# Patient Record
Sex: FEMALE | Race: WHITE | NOT HISPANIC OR LATINO | Employment: PART TIME | URBAN - METROPOLITAN AREA
[De-identification: names, ages, dates, MRNs, and addresses within clinical notes are randomized per-mention and may not be internally consistent; named-entity substitution may affect disease eponyms.]

---

## 2017-02-17 ENCOUNTER — HOSPITAL ENCOUNTER (OUTPATIENT)
Dept: OTHER | Facility: HOSPITAL | Age: 57
Discharge: HOME | End: 2017-02-17
Attending: OBSTETRICS & GYNECOLOGY | Admitting: OBSTETRICS & GYNECOLOGY

## 2017-02-17 LAB
BASOPHILS # BLD AUTO: 0.1 10*3/UL (ref 0–0.2)
BASOPHILS NFR BLD AUTO: 1 % (ref 0–2)
DIFFERENTIAL METHOD BLD: (no result)
EOSINOPHIL # BLD AUTO: 0.2 10*3/UL (ref 0–0.3)
EOSINOPHIL # BLD AUTO: 2 % (ref 0–3)
ERYTHROCYTE [DISTWIDTH] IN BLOOD BY AUTOMATED COUNT: 13.4 % (ref 11.5–14.5)
HCT VFR BLD AUTO: 38.3 % (ref 35–49)
HGB BLD-MCNC: 12.9 G/DL (ref 12–15)
LYMPHOCYTES # BLD AUTO: 1.9 10*3/UL (ref 0.8–4.8)
LYMPHOCYTES NFR BLD AUTO: 28 % (ref 18–42)
MCH RBC QN AUTO: 31.2 PG (ref 26–32)
MCHC RBC AUTO-ENTMCNC: 33.8 G/DL (ref 32–36)
MCV RBC AUTO: 92.2 FL (ref 80–94)
MONOCYTES # BLD AUTO: 0.4 10*3/UL (ref 0.1–1.3)
MONOCYTES NFR BLD AUTO: 7 % (ref 2–11)
NEUTROPHILS # BLD AUTO: 4.1 10*3/UL (ref 2.3–8.6)
NEUTROPHILS NFR BLD AUTO: 62 % (ref 50–75)
NRBC BLD AUTO-RTO: 0 /100{WBCS}
NRBC/RBC NFR BLD MANUAL: 0 10*3/UL
PLATELET # BLD AUTO: 208 10*3/UL (ref 150–450)
PMV BLD AUTO: 9.3 FL (ref 7.4–10.4)
RBC # BLD AUTO: 4.15 10*6/UL (ref 4–5.4)
WBC # BLD AUTO: 6.7 10*3/UL (ref 4.5–11.5)

## 2017-09-11 ENCOUNTER — APPOINTMENT (OUTPATIENT)
Dept: WOMENS IMAGING | Facility: HOSPITAL | Age: 57
End: 2017-09-11

## 2017-09-11 PROCEDURE — 77063 BREAST TOMOSYNTHESIS BI: CPT | Performed by: RADIOLOGY

## 2017-09-11 PROCEDURE — 77067 SCR MAMMO BI INCL CAD: CPT | Performed by: RADIOLOGY

## 2018-10-05 ENCOUNTER — APPOINTMENT (OUTPATIENT)
Dept: WOMENS IMAGING | Facility: HOSPITAL | Age: 58
End: 2018-10-05

## 2018-10-05 PROCEDURE — 77063 BREAST TOMOSYNTHESIS BI: CPT | Performed by: RADIOLOGY

## 2018-10-05 PROCEDURE — 77067 SCR MAMMO BI INCL CAD: CPT | Performed by: RADIOLOGY

## 2019-08-22 ENCOUNTER — OFFICE VISIT (OUTPATIENT)
Dept: FAMILY MEDICINE CLINIC | Facility: CLINIC | Age: 59
End: 2019-08-22

## 2019-08-22 VITALS
DIASTOLIC BLOOD PRESSURE: 80 MMHG | BODY MASS INDEX: 23.92 KG/M2 | HEART RATE: 57 BPM | OXYGEN SATURATION: 100 % | WEIGHT: 135 LBS | SYSTOLIC BLOOD PRESSURE: 113 MMHG | HEIGHT: 63 IN

## 2019-08-22 DIAGNOSIS — G89.29 CHRONIC LEFT-SIDED LOW BACK PAIN WITH LEFT-SIDED SCIATICA: Primary | ICD-10-CM

## 2019-08-22 DIAGNOSIS — M54.42 CHRONIC LEFT-SIDED LOW BACK PAIN WITH LEFT-SIDED SCIATICA: Primary | ICD-10-CM

## 2019-08-22 PROCEDURE — 99213 OFFICE O/P EST LOW 20 MIN: CPT | Performed by: NURSE PRACTITIONER

## 2019-08-22 RX ORDER — BACLOFEN 10 MG/1
10 TABLET ORAL 3 TIMES DAILY
Qty: 30 TABLET | Refills: 0 | Status: SHIPPED | OUTPATIENT
Start: 2019-08-22 | End: 2019-12-04

## 2019-08-22 NOTE — PROGRESS NOTES
Subjective   Natividad Messer is a 59 y.o. female.     Patient presents with worsening low back pain. She reports ongoing issues for the past 3 years. She has MRI completed 3 years ago.       Back Pain   This is a chronic problem. The current episode started more than 1 year ago. The problem occurs daily. The problem has been gradually worsening since onset. The pain is present in the lumbar spine. The quality of the pain is described as shooting and stabbing. The pain radiates to the left knee. The symptoms are aggravated by standing. Pertinent negatives include no abdominal pain, bladder incontinence, bowel incontinence, chest pain, dysuria, fever, headaches, leg pain, numbness, paresis, paresthesias, pelvic pain, perianal numbness, tingling, weakness or weight loss.        The following portions of the patient's history were reviewed and updated as appropriate: allergies, current medications, past family history, past medical history, past social history, past surgical history and problem list.    Review of Systems   Constitutional: Negative for fever and unexpected weight loss.   Cardiovascular: Negative for chest pain.   Gastrointestinal: Negative for abdominal pain and bowel incontinence.   Genitourinary: Negative for dysuria, pelvic pain and urinary incontinence.   Musculoskeletal: Positive for back pain.   Neurological: Negative for tingling, weakness, numbness and paresthesias.       Objective   Physical Exam   Constitutional: She is oriented to person, place, and time. She appears well-developed and well-nourished. No distress.   Musculoskeletal: Normal range of motion. She exhibits no edema.   Neurological: She is alert and oriented to person, place, and time. She displays normal reflexes. No sensory deficit. She exhibits normal muscle tone. Coordination normal.   Skin: She is not diaphoretic.         Assessment/Plan   Natividad was seen today for establish care and back pain.    Diagnoses and all orders for  this visit:    Chronic left-sided low back pain with left-sided sciatica  -     Ambulatory Referral to Neurosurgery  -     MRI Lumbar Spine Without Contrast; Future  -     baclofen (LIORESAL) 10 MG tablet; Take 1 tablet by mouth 3 (Three) Times a Day.  - Patient taking OTC NSAIDs and doing PT exercises.  Send for repeat MRI and ortho spine referral.

## 2019-10-18 ENCOUNTER — APPOINTMENT (OUTPATIENT)
Dept: WOMENS IMAGING | Facility: HOSPITAL | Age: 59
End: 2019-10-18

## 2019-10-18 PROCEDURE — 77067 SCR MAMMO BI INCL CAD: CPT | Performed by: RADIOLOGY

## 2019-10-18 PROCEDURE — 77063 BREAST TOMOSYNTHESIS BI: CPT | Performed by: RADIOLOGY

## 2019-12-02 ENCOUNTER — TELEPHONE (OUTPATIENT)
Dept: NEUROSURGERY | Facility: CLINIC | Age: 59
End: 2019-12-02

## 2019-12-02 NOTE — TELEPHONE ENCOUNTER
Called patient- she was never sent NP packet- I mailed her one and told her to arrive 30 min with it completed/cc

## 2019-12-04 ENCOUNTER — OFFICE VISIT (OUTPATIENT)
Dept: FAMILY MEDICINE CLINIC | Facility: CLINIC | Age: 59
End: 2019-12-04

## 2019-12-04 VITALS
OXYGEN SATURATION: 100 % | HEART RATE: 58 BPM | HEIGHT: 63 IN | TEMPERATURE: 97.7 F | WEIGHT: 133 LBS | BODY MASS INDEX: 23.57 KG/M2 | SYSTOLIC BLOOD PRESSURE: 169 MMHG | DIASTOLIC BLOOD PRESSURE: 79 MMHG

## 2019-12-04 DIAGNOSIS — K64.4 EXTERNAL HEMORRHOIDS: Primary | ICD-10-CM

## 2019-12-04 DIAGNOSIS — R03.0 ELEVATED BP WITHOUT DIAGNOSIS OF HYPERTENSION: ICD-10-CM

## 2019-12-04 DIAGNOSIS — M51.16 HERNIATION OF LUMBAR INTERVERTEBRAL DISC WITH RADICULOPATHY: ICD-10-CM

## 2019-12-04 PROBLEM — H81.10 BENIGN PAROXYSMAL POSITIONAL VERTIGO: Status: ACTIVE | Noted: 2017-11-30

## 2019-12-04 PROCEDURE — 99213 OFFICE O/P EST LOW 20 MIN: CPT | Performed by: FAMILY MEDICINE

## 2019-12-04 NOTE — PATIENT INSTRUCTIONS
Hemorrhoids  Hemorrhoids are swollen veins in and around the rectum or anus. There are two types of hemorrhoids:  · Internal hemorrhoids. These occur in the veins that are just inside the rectum. They may poke through to the outside and become irritated and painful.  · External hemorrhoids. These occur in the veins that are outside the anus and can be felt as a painful swelling or hard lump near the anus.  Most hemorrhoids do not cause serious problems, and they can be managed with home treatments such as diet and lifestyle changes. If home treatments do not help the symptoms, procedures can be done to shrink or remove the hemorrhoids.  What are the causes?  This condition is caused by increased pressure in the anal area. This pressure may result from various things, including:  · Constipation.  · Straining to have a bowel movement.  · Diarrhea.  · Pregnancy.  · Obesity.  · Sitting for long periods of time.  · Heavy lifting or other activity that causes you to strain.  · Anal sex.  · Riding a bike for a long period of time.  What are the signs or symptoms?  Symptoms of this condition include:  · Pain.  · Anal itching or irritation.  · Rectal bleeding.  · Leakage of stool (feces).  · Anal swelling.  · One or more lumps around the anus.  How is this diagnosed?  This condition can often be diagnosed through a visual exam. Other exams or tests may also be done, such as:  · An exam that involves feeling the rectal area with a gloved hand (digital rectal exam).  · An exam of the anal canal that is done using a small tube (anoscope).  · A blood test, if you have lost a significant amount of blood.  · A test to look inside the colon using a flexible tube with a camera on the end (sigmoidoscopy or colonoscopy).  How is this treated?  This condition can usually be treated at home. However, various procedures may be done if dietary changes, lifestyle changes, and other home treatments do not help your symptoms. These  procedures can help make the hemorrhoids smaller or remove them completely. Some of these procedures involve surgery, and others do not. Common procedures include:  · Rubber band ligation. Rubber bands are placed at the base of the hemorrhoids to cut off their blood supply.  · Sclerotherapy. Medicine is injected into the hemorrhoids to shrink them.  · Infrared coagulation. A type of light energy is used to get rid of the hemorrhoids.  · Hemorrhoidectomy surgery. The hemorrhoids are surgically removed, and the veins that supply them are tied off.  · Stapled hemorrhoidopexy surgery. The surgeon staples the base of the hemorrhoid to the rectal wall.  Follow these instructions at home:  Eating and drinking    · Eat foods that have a lot of fiber in them, such as whole grains, beans, nuts, fruits, and vegetables.  · Ask your health care provider about taking products that have added fiber (fiber supplements).  · Reduce the amount of fat in your diet. You can do this by eating low-fat dairy products, eating less red meat, and avoiding processed foods.  · Drink enough fluid to keep your urine pale yellow.  Managing pain and swelling    · Take warm sitz baths for 20 minutes, 3-4 times a day to ease pain and discomfort. You may do this in a bathtub or using a portable sitz bath that fits over the toilet.  · If directed, apply ice to the affected area. Using ice packs between sitz baths may be helpful.  ? Put ice in a plastic bag.  ? Place a towel between your skin and the bag.  ? Leave the ice on for 20 minutes, 2-3 times a day.  General instructions  · Take over-the-counter and prescription medicines only as told by your health care provider.  · Use medicated creams or suppositories as told.  · Get regular exercise. Ask your health care provider how much and what kind of exercise is best for you. In general, you should do moderate exercise for at least 30 minutes on most days of the week (150 minutes each week). This can  include activities such as walking, biking, or yoga.  · Go to the bathroom when you have the urge to have a bowel movement. Do not wait.  · Avoid straining to have bowel movements.  · Keep the anal area dry and clean. Use wet toilet paper or moist towelettes after a bowel movement.  · Do not sit on the toilet for long periods of time. This increases blood pooling and pain.  · Keep all follow-up visits as told by your health care provider. This is important.  Contact a health care provider if you have:  · Increasing pain and swelling that are not controlled by treatment or medicine.  · Difficulty having a bowel movement, or you are unable to have a bowel movement.  · Pain or inflammation outside the area of the hemorrhoids.  Get help right away if you have:  · Uncontrolled bleeding from your rectum.  Summary  · Hemorrhoids are swollen veins in and around the rectum or anus.  · Most hemorrhoids can be managed with home treatments such as diet and lifestyle changes.  · Taking warm sitz baths can help ease pain and discomfort.  · In severe cases, procedures or surgery can be done to shrink or remove the hemorrhoids.  This information is not intended to replace advice given to you by your health care provider. Make sure you discuss any questions you have with your health care provider.  Document Released: 12/15/2001 Document Revised: 05/09/2019 Document Reviewed: 05/09/2019  [employer] Interactive Patient Education © 2019 [employer]    How to Take a Sitz Bath  A sitz bath is a warm water bath that may be used to care for your rectum, genital area, or the area between your rectum and genitals (perineum). For a sitz bath, the water only comes up to your hips and covers your buttocks. A sitz bath may done at home in a bathtub or with a portable sitz bath that fits over the toilet.  Your health care provider may recommend a sitz bath to help:  · Relieve pain and discomfort after delivering a baby.  · Relieve pain and  itching from hemorrhoids or anal fissures.  · Relieve pain after certain surgeries.  · Relax muscles that are sore or tight.  How to take a sitz bath  Take 3-4 sitz baths a day, or as many as told by your health care provider.  Bathtub sitz bath  To take a sitz bath in a bathtub:  1. Partially fill a bathtub with warm water. The water should be deep enough to cover your hips and buttocks when you are sitting in the tub.  2. If your health care provider told you to put medicine in the water, follow his or her instructions.  3. Sit in the water.  4. Open the tub drain a little, and leave it open during your bath.  5. Turn on the warm water again, enough to replace the water that is draining out. Keep the water running throughout your bath. This helps keep the water at the right level and the right temperature.  6. Soak in the water for 15-20 minutes, or as long as told by your health care provider.  7. When you are done, be careful when you stand up. You may feel dizzy.  8. After the sitz bath, pat yourself dry. Do not rub your skin to dry it.    Over-the-toilet sitz bath  To take a sitz bath with an over-the-toilet basin:  1. Follow the 's instructions.  2. Fill the basin with warm water.  3. If your health care provider told you to put medicine in the water, follow his or her instructions.  4. Sit on the seat. Make sure the water covers your buttocks and perineum.  5. Soak in the water for 15-20 minutes, or as long as told by your health care provider.  6. After the sitz bath, pat yourself dry. Do not rub your skin to dry it.  7. Clean and dry the basin between uses.  8. Discard the basin if it cracks, or according to the 's instructions.  Contact a health care provider if:  · Your symptoms get worse. Do not continue with sitz baths if your symptoms get worse.  · You have new symptoms. If this happens, do not continue with sitz baths until you talk with your health care  provider.  Summary  · A sitz bath is a warm water bath in which the water only comes up to your hips and covers your buttocks.  · A sitz bath may help relieve itching, relieve pain, and relax muscles that are sore or tight in the lower part of your body, including your genital area.  · Take 3-4 sitz baths a day, or as many as told by your health care provider. Soak in the water for 15-20 minutes.  · Do not continue with sitz baths if your symptoms get worse.  This information is not intended to replace advice given to you by your health care provider. Make sure you discuss any questions you have with your health care provider.  Document Released: 09/09/2005 Document Revised: 12/20/2018 Document Reviewed: 12/20/2018  [employer] Interactive Patient Education © 2019 [employer]

## 2019-12-04 NOTE — PROGRESS NOTES
Subjective:     Natividad Messer is a 59 y.o. female who presents for  Chief Complaint   Patient presents with   • Cyst     knot on anus - been there since Thanksgiving    • Establish Care     new pt - wants MRI of low back for an upcoming appt        This is a new patient to me.    Patient presents with complaints of a rectal mass appreciated while in the shower.  Patient denies any rectal pain or bleeding.  Patient has a concurrent medical history of irritable bowel syndrome and associated constipation.  Reports a bowel movement every 3 days.  Since last screening colonoscopy was approximately 8 years ago.  Patient has scheduled appointment with gastroenterology of [facility] since identifying rectal mass.      Cyst   This is a new problem. The current episode started in the past 7 days. The problem occurs daily. The problem has been unchanged. Associated symptoms include arthralgias. Pertinent negatives include no abdominal pain, change in bowel habit, chest pain, chills, congestion, coughing, diaphoresis, fatigue, fever, myalgias, nausea, rash, sore throat or vomiting. Nothing aggravates the symptoms. She has tried nothing for the symptoms.     Patient also has a concurrent medical history of degenerative disc disease with herniation of the lumbar spine.  Patient was previously evaluated by neurosurgery at [facility] for similar complaints.  Review of EMR indicates the patient underwent an MRI of spine in 2016 that revealed mild to moderate stenosis at L4-L5.  Patient is scheduled to follow-up with neurosurgery later this month.    Preventative:  Health Maintenance   Topic Date Due   • MAMMOGRAM  1960   • ANNUAL PHYSICAL  04/12/1964   • TDAP/TD VACCINES (1 - Tdap) 04/12/1980   • ZOSTER VACCINE (1 of 2) 04/12/2011   • INFLUENZA VACCINE  08/01/2019   • HEPATITIS C SCREENING  08/22/2019   • PAP SMEAR  08/22/2019   • COLONOSCOPY  08/22/2019       Past Medical Hx:  History reviewed. No pertinent past  medical history.    Past Surgical Hx:  Past Surgical History:   Procedure Laterality Date   • ABDOMINOPLASTY     • BREAST SURGERY     •  SECTION     • HYSTERECTOMY     • TUBAL ABDOMINAL LIGATION         Family Hx:  Family History   Problem Relation Age of Onset   • Heart attack Mother    • Heart disease Father         Social History:  Social History     Socioeconomic History   • Marital status:      Spouse name: Not on file   • Number of children: Not on file   • Years of education: Not on file   • Highest education level: Not on file   Tobacco Use   • Smoking status: Never Smoker   • Smokeless tobacco: Never Used   Substance and Sexual Activity   • Alcohol use: Yes     Comment: social   • Drug use: No       Allergies:  Morphine    Current Meds:  No current outpatient medications on file.    The following portions of the patient's history were reviewed and updated as appropriate: allergies, current medications, past family history, past medical history, past social history, past surgical history and problem list.    Review of Systems  Review of Systems   Constitutional: Negative for chills, diaphoresis, fatigue and fever.   HENT: Negative for congestion, rhinorrhea, sinus pressure, sneezing and sore throat.    Eyes: Negative for blurred vision, double vision and redness.   Respiratory: Negative for cough, shortness of breath and wheezing.    Cardiovascular: Negative for chest pain and leg swelling.   Gastrointestinal: Negative for abdominal pain, change in bowel habit, constipation, diarrhea, nausea and vomiting.        Rectal mass   Endocrine: Negative for polydipsia, polyphagia and polyuria.   Genitourinary: Negative for difficulty urinating, dysuria and hematuria.   Musculoskeletal: Positive for arthralgias and back pain. Negative for gait problem and myalgias.   Skin: Negative for rash and skin lesions.   Neurological: Negative for tremors, seizures, syncope and headache.   Psychiatric/Behavioral:  "Negative for sleep disturbance and depressed mood. The patient is nervous/anxious.        Objective:     /79 (BP Location: Right arm, Patient Position: Sitting, Cuff Size: Small Adult)   Pulse 58   Temp 97.7 °F (36.5 °C) (Oral)   Ht 158.8 cm (62.5\")   Wt 60.3 kg (133 lb)   SpO2 100%   BMI 23.94 kg/m²     Physical Exam   Constitutional: She is oriented to person, place, and time. She appears well-developed and well-nourished. No distress.   HENT:   Head: Normocephalic and atraumatic.   Nose: Nose normal.   Mouth/Throat: Oropharynx is clear and moist.   Eyes: Pupils are equal, round, and reactive to light. Conjunctivae and EOM are normal. No scleral icterus.   Cardiovascular: Normal rate, regular rhythm, normal heart sounds and intact distal pulses.   Pulmonary/Chest: Effort normal and breath sounds normal.   Abdominal: Soft. Bowel sounds are normal. There is no tenderness.   Genitourinary: Rectal exam shows external hemorrhoid. Rectal exam shows no fissure and no mass. Pelvic exam was performed with patient in the knee-chest position.   Genitourinary Comments: Exam chaperoned by YAKOV Trujillo   Neurological: She is alert and oriented to person, place, and time.   Skin: Skin is warm and dry. Capillary refill takes less than 2 seconds. No rash noted. She is not diaphoretic.   Psychiatric: She has a normal mood and affect. Her behavior is normal.   Vitals reviewed.      Lab Results   Component Value Date    WBC 6.7 02/17/2017    HGB 12.9 02/17/2017    HCT 38.3 02/17/2017    MCV 92.2 02/17/2017     02/17/2017     No results found for: GLUCOSE, BUN, CREATININE, EGFRIFNONA, EGFRIFAFRI, BCR, K, CO2, CALCIUM, PROTENTOTREF, ALBUMIN, LABIL2, AST, ALT     Assessment/Plan:     Natividad was seen today for cyst and establish care.    Diagnoses and all orders for this visit:    External hemorrhoids  Discussed pathophysiology of hemorrhoids with patient. Encouraged a high fiber diet and adequate PO hydration. " Patient provided with educational material in AVS. Advised Sitz baths twice a to help alleviated rectal pain. Advised using OTC rectal ointment that contained both lidocaine and hydrocortisone as needed for rectal pain. Advised keeping appointment with GI in case hemorrhoids worsened to discussed more aggressive management options.     Herniation of lumbar intervertebral disc with radiculopathy  Chronic problem dating back to 2016 per EMR review. Documentation in EMR indicated that patient underwent an MRI in 2016. Orders were placed for new images by former PCP in August 2019. Imaging requested denied by insurance. Patient is scheduled to be seen by neurosurgery. Advised to obtained imaging records from 2016 and discuss need for re-imaging with neurosurgeon.     Elevated BP without diagnosis of hypertension  Hypertensive in office. Discussed BP goal of <140/90. Encouraged ambulatory monitoring of BP. Encouraged low sodium diet and 150 minutes of moderate intensity activity weekly. Advised returning to the office if BP remains elevated.     Follow-up:     Return in about 6 months (around 6/4/2020) for Annual physical.      Signature    Navya Almanzar MD  [facility]        This document has been electronically signed by Navya Almanzar MD on December 4, 2019 1:31 PM

## 2019-12-05 NOTE — PROGRESS NOTES
Subjective   Patient ID: Natividad Messer is a 59 y.o. female is being seen for consultation today at the request of BIANCA Warner for 2nd opinion for constant mild to moderate low back pain, intermittent mild left leg pain. She denies leg weakness or N/T.   Patient states that sitting makes her pain worse.  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  She has not had JONO or pain management.  She has had MDP as needed in the past which helped temporarily.    Her last MRI was in 2016, she states that her PCP tried to get MRI approved recently and [place] denied it.    Patient saw Dr Zavala in 2016 for low back pain and was referred to PT    This very nice lady has a long history of low back pain and some left leg symptoms including pain that have been going on since 2008. She is very active and used to be a runner but does not run much anymore. She continues to exercise regularly. 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 She had an MRI in 2016 when her symptoms were not as bad as they are now and at that time she saw Dr. Zavala. Nothing surgical was recommended. More recently she has had more pain in the back and the left leg. Activity such as standing and walking tend to make it worse. Lying down tends to make it better. I cannot detect any outright weakness. Her nurse practitioner could not get an MRI approved. I think she actually does need an MRI given her worsening radicular symptoms. I think we would need to define structurally what is happening before I can make a further recommendation. It may be some formal PT or even injections but will get the imaging done and have her come back to discuss it.      History of Present Illness    The following portions of the patient's history were reviewed and updated as appropriate: allergies, current medications, past family history, past medical history, past social history, past surgical history and  problem list.    Review of Systems   All other systems reviewed and are negative.      Objective   Physical Exam   Constitutional: She is oriented to person, place, and time. She appears well-developed and well-nourished.   HENT:   Head: Normocephalic and atraumatic.   Eyes: Pupils are equal, round, and reactive to light. Conjunctivae and EOM are normal.   Fundoscopic exam:       The right eye shows no papilledema. The right eye shows venous pulsations.        The left eye shows no papilledema. The left eye shows venous pulsations.   Neck: Carotid bruit is not present.   Neurological: She is oriented to person, place, and time. She has a normal Finger-Nose-Finger Test and a normal Heel to Shin Test. Gait normal.   Reflex Scores:       Tricep reflexes are 2+ on the right side and 2+ on the left side.       Bicep reflexes are 2+ on the right side and 2+ on the left side.       Brachioradialis reflexes are 2+ on the right side and 2+ on the left side.       Patellar reflexes are 2+ on the right side and 2+ on the left side.       Achilles reflexes are 2+ on the right side and 2+ on the left side.  Psychiatric: Her speech is normal.     Neurologic Exam     Mental Status   Oriented to person, place, and time.   Registration of memory: Good recent and remote memory.   Attention: normal. Concentration: normal.   Speech: speech is normal   Level of consciousness: alert  Knowledge: consistent with education.     Cranial Nerves     CN II   Visual fields full to confrontation.   Visual acuity: normal    CN III, IV, VI   Pupils are equal, round, and reactive to light.  Extraocular motions are normal.     CN V   Facial sensation intact.   Right corneal reflex: normal  Left corneal reflex: normal    CN VII   Facial expression full, symmetric.   Right facial weakness: none  Left facial weakness: none    CN VIII   Hearing: intact    CN IX, X   Palate: symmetric    CN XI   Right sternocleidomastoid strength: normal  Left  sternocleidomastoid strength: normal    CN XII   Tongue: not atrophic  Tongue deviation: none    Motor Exam   Muscle bulk: normal  Right arm tone: normal  Left arm tone: normal  Right leg tone: normal  Left leg tone: normal    Strength   Strength 5/5 except as noted.     Sensory Exam   Light touch normal.     Gait, Coordination, and Reflexes     Gait  Gait: normal    Coordination   Finger to nose coordination: normal  Heel to shin coordination: normal    Reflexes   Right brachioradialis: 2+  Left brachioradialis: 2+  Right biceps: 2+  Left biceps: 2+  Right triceps: 2+  Left triceps: 2+  Right patellar: 2+  Left patellar: 2+  Right achilles: 2+  Left achilles: 2+  Right : 2+  Left : 2+      Assessment/Plan   Independent Review of Radiographic Studies:    The lumbar MRI done 5/17/2016 was reviewed.  There was a central disc extrusion at L4-L5 with superimposed spinal stenosis identified at that time with some degenerative disc disease at other levels.  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  Agree with the report.      Medical Decision Making:    We will go ahead and try and obtain another lumbar MRI with plain x-rays before we decide what she needs to do.  The worsening of the radicular symptoms makes it necessary to define more precisely structurally what is going on inside of her spine, hence the imaging.      Natividad was seen today for back pain and leg pain.    Diagnoses and all orders for this visit:    DDD (degenerative disc disease), lumbar  -     MRI Lumbar Spine Without Contrast; Future  -     XR Spine Lumbar Complete With Flex & Ext; Future    Herniation of lumbar intervertebral disc with radiculopathy  -     MRI Lumbar Spine Without Contrast; Future  -     XR Spine Lumbar Complete With Flex & Ext; Future    Spinal stenosis of lumbar region with neurogenic claudication  -     MRI Lumbar Spine Without Contrast; Future  -     XR Spine Lumbar Complete With Flex & Ext; Future      Return in about 19  days (around 12/30/2019) for After tests.

## 2019-12-11 ENCOUNTER — OFFICE VISIT (OUTPATIENT)
Dept: NEUROSURGERY | Facility: CLINIC | Age: 59
End: 2019-12-11

## 2019-12-11 VITALS
SYSTOLIC BLOOD PRESSURE: 125 MMHG | HEIGHT: 63 IN | BODY MASS INDEX: 23.21 KG/M2 | DIASTOLIC BLOOD PRESSURE: 80 MMHG | WEIGHT: 131 LBS | HEART RATE: 68 BPM

## 2019-12-11 DIAGNOSIS — M51.36 DDD (DEGENERATIVE DISC DISEASE), LUMBAR: Primary | ICD-10-CM

## 2019-12-11 DIAGNOSIS — M51.16 HERNIATION OF LUMBAR INTERVERTEBRAL DISC WITH RADICULOPATHY: ICD-10-CM

## 2019-12-11 DIAGNOSIS — M48.062 SPINAL STENOSIS OF LUMBAR REGION WITH NEUROGENIC CLAUDICATION: ICD-10-CM

## 2019-12-11 PROCEDURE — 99243 OFF/OP CNSLTJ NEW/EST LOW 30: CPT | Performed by: NEUROLOGICAL SURGERY

## 2019-12-11 RX ORDER — IBUPROFEN 200 MG
200 TABLET ORAL EVERY 6 HOURS PRN
COMMUNITY
End: 2020-03-16

## 2019-12-27 ENCOUNTER — HOSPITAL ENCOUNTER (OUTPATIENT)
Dept: GENERAL RADIOLOGY | Facility: HOSPITAL | Age: 59
Discharge: HOME | End: 2019-12-27

## 2019-12-27 ENCOUNTER — HOSPITAL ENCOUNTER (OUTPATIENT)
Dept: MRI IMAGING | Facility: HOSPITAL | Age: 59
Discharge: HOME | End: 2019-12-27
Admitting: NEUROLOGICAL SURGERY

## 2019-12-27 DIAGNOSIS — M51.16 HERNIATION OF LUMBAR INTERVERTEBRAL DISC WITH RADICULOPATHY: ICD-10-CM

## 2019-12-27 DIAGNOSIS — M48.062 SPINAL STENOSIS OF LUMBAR REGION WITH NEUROGENIC CLAUDICATION: ICD-10-CM

## 2019-12-27 DIAGNOSIS — M51.36 DDD (DEGENERATIVE DISC DISEASE), LUMBAR: ICD-10-CM

## 2019-12-27 PROCEDURE — 72148 MRI LUMBAR SPINE W/O DYE: CPT

## 2019-12-27 PROCEDURE — 72114 X-RAY EXAM L-S SPINE BENDING: CPT

## 2020-02-03 ENCOUNTER — OFFICE VISIT (OUTPATIENT)
Dept: NEUROSURGERY | Facility: CLINIC | Age: 60
End: 2020-02-03

## 2020-02-03 VITALS
SYSTOLIC BLOOD PRESSURE: 115 MMHG | BODY MASS INDEX: 23.92 KG/M2 | DIASTOLIC BLOOD PRESSURE: 65 MMHG | HEART RATE: 69 BPM | HEIGHT: 63 IN | WEIGHT: 135 LBS

## 2020-02-03 DIAGNOSIS — M51.16 HERNIATION OF LUMBAR INTERVERTEBRAL DISC WITH RADICULOPATHY: Primary | ICD-10-CM

## 2020-02-03 DIAGNOSIS — M48.062 SPINAL STENOSIS OF LUMBAR REGION WITH NEUROGENIC CLAUDICATION: ICD-10-CM

## 2020-02-03 DIAGNOSIS — M51.36 DDD (DEGENERATIVE DISC DISEASE), LUMBAR: ICD-10-CM

## 2020-02-03 PROCEDURE — 99214 OFFICE O/P EST MOD 30 MIN: CPT | Performed by: NEUROLOGICAL SURGERY

## 2020-03-16 ENCOUNTER — OFFICE VISIT (OUTPATIENT)
Dept: FAMILY MEDICINE CLINIC | Facility: CLINIC | Age: 60
End: 2020-03-16

## 2020-03-16 VITALS
SYSTOLIC BLOOD PRESSURE: 123 MMHG | HEART RATE: 57 BPM | HEIGHT: 63 IN | DIASTOLIC BLOOD PRESSURE: 80 MMHG | OXYGEN SATURATION: 100 % | BODY MASS INDEX: 24.1 KG/M2 | WEIGHT: 136 LBS

## 2020-03-16 DIAGNOSIS — N30.00 ACUTE CYSTITIS WITHOUT HEMATURIA: Primary | ICD-10-CM

## 2020-03-16 LAB
BILIRUB BLD-MCNC: NEGATIVE MG/DL
CLARITY, POC: CLEAR
COLOR UR: YELLOW
GLUCOSE UR STRIP-MCNC: NEGATIVE MG/DL
KETONES UR QL: NEGATIVE
LEUKOCYTE EST, POC: NEGATIVE
NITRITE UR-MCNC: NEGATIVE MG/ML
PH UR: 7 [PH] (ref 5–8)
PROT UR STRIP-MCNC: NEGATIVE MG/DL
RBC # UR STRIP: ABNORMAL /UL
SP GR UR: 1.01 (ref 1–1.03)
UROBILINOGEN UR QL: NORMAL

## 2020-03-16 PROCEDURE — 99213 OFFICE O/P EST LOW 20 MIN: CPT | Performed by: NURSE PRACTITIONER

## 2020-03-16 PROCEDURE — 81003 URINALYSIS AUTO W/O SCOPE: CPT | Performed by: NURSE PRACTITIONER

## 2020-03-16 RX ORDER — PHENAZOPYRIDINE HYDROCHLORIDE 100 MG/1
100 TABLET, FILM COATED ORAL 3 TIMES DAILY PRN
Qty: 10 TABLET | Refills: 0 | Status: SHIPPED | OUTPATIENT
Start: 2020-03-16

## 2020-03-16 RX ORDER — CIPROFLOXACIN 250 MG/1
250 TABLET, FILM COATED ORAL 2 TIMES DAILY
Qty: 6 TABLET | Refills: 0 | Status: SHIPPED | OUTPATIENT
Start: 2020-03-16 | End: 2020-03-19

## 2020-03-16 NOTE — PROGRESS NOTES
Subjective   Natividad Messer is a 59 y.o. female.       HPI   Pt. Is here today with c/o a UTI.  Symptoms started a week ago; she has felt urgency and frequency with urination.  Did take pyridium a few times last week.  She never saw blood in the urine.  No fevers.    She denies any burning.     The following portions of the patient's history were reviewed and updated as appropriate: allergies, current medications, past family history, past medical history, past social history, past surgical history and problem list.    Review of Systems   Constitutional: Negative for activity change, appetite change, chills, diaphoresis, fatigue, fever, unexpected weight gain and unexpected weight loss.   Respiratory: Negative for cough, chest tightness, shortness of breath and wheezing.    Cardiovascular: Negative for chest pain, palpitations and leg swelling.   Gastrointestinal: Negative for abdominal pain, constipation, diarrhea, nausea and vomiting.   Genitourinary: Positive for dysuria, frequency and urgency. Negative for decreased urine volume, flank pain, hematuria and pelvic pain.   Musculoskeletal: Negative for arthralgias, back pain and myalgias.   Skin: Negative for rash.   Neurological: Negative for dizziness, light-headedness, headache and confusion.   Psychiatric/Behavioral: Negative for depressed mood. The patient is not nervous/anxious.        Objective   Physical Exam   Constitutional: She appears well-developed and well-nourished. No distress.   Cardiovascular: Normal rate, regular rhythm, normal heart sounds and intact distal pulses.   No murmur heard.  Pulmonary/Chest: Effort normal and breath sounds normal. No respiratory distress. She has no wheezes. She exhibits no tenderness.   Abdominal: Soft. Bowel sounds are normal. She exhibits no distension. There is no tenderness. There is no rebound and no guarding.   Skin: Skin is warm and dry. No erythema.   Psychiatric: She has a normal mood and affect.   Vitals  reviewed.        Assessment/Plan   Natividad was seen today for urinary tract infection.    Diagnoses and all orders for this visit:    Acute cystitis without hematuria  Comments:  UA shows trace blood; sent for culture  Orders:  -     POC Urinalysis Dipstick, Automated  -     ciprofloxacin (CIPRO) 250 MG tablet; Take 1 tablet by mouth 2 (Two) Times a Day for 3 days.  -     phenazopyridine (PYRIDIUM) 100 MG tablet; Take 1 tablet by mouth 3 (Three) Times a Day As Needed for Bladder Spasms.

## 2020-08-05 ENCOUNTER — TELEPHONE (OUTPATIENT)
Dept: FAMILY MEDICINE CLINIC | Facility: CLINIC | Age: 60
End: 2020-08-05

## 2020-08-05 NOTE — TELEPHONE ENCOUNTER
Please call patient and let her know that I do not know her blood type.  We do not routinely check blood type.  Blood type is typically checked if patient needs a blood transfusion and during pregnancy.  If patient is interested in knowing her blood type, she can donate blood at the [facility].

## 2020-10-19 ENCOUNTER — APPOINTMENT (OUTPATIENT)
Dept: WOMENS IMAGING | Facility: HOSPITAL | Age: 60
End: 2020-10-19

## 2020-10-19 PROCEDURE — 77063 BREAST TOMOSYNTHESIS BI: CPT | Performed by: RADIOLOGY

## 2020-10-19 PROCEDURE — 77067 SCR MAMMO BI INCL CAD: CPT | Performed by: RADIOLOGY

## 2021-01-03 ENCOUNTER — IMMUNIZATION (OUTPATIENT)
Dept: VACCINE CLINIC | Facility: HOSPITAL | Age: 61
End: 2021-01-03

## 2021-01-03 PROCEDURE — 91300 HC SARSCOV02 VAC 30MCG/0.3ML IM: CPT | Performed by: INTERNAL MEDICINE

## 2021-01-03 PROCEDURE — 0001A: CPT | Performed by: INTERNAL MEDICINE

## 2021-01-05 ENCOUNTER — APPOINTMENT (OUTPATIENT)
Dept: VACCINE CLINIC | Facility: HOSPITAL | Age: 61
End: 2021-01-05

## 2021-01-25 ENCOUNTER — IMMUNIZATION (OUTPATIENT)
Dept: VACCINE CLINIC | Facility: HOSPITAL | Age: 61
End: 2021-01-25

## 2021-01-25 PROCEDURE — 91300 HC SARSCOV02 VAC 30MCG/0.3ML IM: CPT | Performed by: INTERNAL MEDICINE

## 2021-01-25 PROCEDURE — 0002A: CPT | Performed by: INTERNAL MEDICINE

## 2021-03-15 ENCOUNTER — TELEPHONE (OUTPATIENT)
Dept: FAMILY MEDICINE CLINIC | Facility: CLINIC | Age: 61
End: 2021-03-15

## 2021-03-15 NOTE — TELEPHONE ENCOUNTER
Caller: Natividad Messer    Relationship to patient: Self    Best call back number: [phone]     Patient is needing: Patient called in and needed a physical before 3/31/2021 and after the 3/17/2021 . Scheduled patient with PA on 3/22/2021 @ 1:30 pm .

## 2021-03-20 NOTE — PROGRESS NOTES
Subjective   Natividad Messer is a 60 y.o. female.     Pt is here for her routine annual exam.    She is established here at this practice but is new to me.  Last mammogram- 2020 and was normal.  Last pap- Done at ob/gyn in 2020-normal  She is on hormone treatment through gynecologist.  DEXA-2020 normal  Last colon cancer screening- last colonoscopy was about 10 years ago and was normal.    Last dental visit- up to date.  Flu shot- up to date    Not exercising regularly but is on her feet all of the time with work.  She is quitting her job so is losing insurance soon.    She plans to start exercising more again.    She did have a sore on her left wrist and tried to pop it.  Yellowish drainage did come out of it.  She has been putting antibiotic ointment on area and it seems to be healing without any surrounding redness.       The following portions of the patient's history were reviewed and updated as appropriate: allergies, current medications, past family history, past medical history, past social history, past surgical history and problem list.  Past Medical History:   Diagnosis Date   • Degenerative disc disease, lumbar      Past Surgical History:   Procedure Laterality Date   • ABDOMINOPLASTY     • BREAST SURGERY     •  SECTION     • HYSTERECTOMY     • TUBAL ABDOMINAL LIGATION       Family History   Problem Relation Age of Onset   • Heart attack Mother    • Heart disease Father      Social History     Socioeconomic History   • Marital status:      Spouse name: Not on file   • Number of children: Not on file   • Years of education: Not on file   • Highest education level: Not on file   Tobacco Use   • Smoking status: Never Smoker   • Smokeless tobacco: Never Used   Substance and Sexual Activity   • Alcohol use: Yes     Comment: social   • Drug use: No         Current Outpatient Medications:   •  estradiol (ESTRACE) 0.1 MG/GM vaginal cream, Apply one-half gram to vaginal  opening once daily., Disp: 42.5 g, Rfl: 7  •  phenazopyridine (PYRIDIUM) 100 MG tablet, Take 1 tablet by mouth 3 (Three) Times a Day As Needed for Bladder Spasms., Disp: 10 tablet, Rfl: 0  •  progesterone (Prometrium) 100 MG capsule, Take 1 capsule by mouth every night at bedtime., Disp: 30 capsule, Rfl: 12    Review of Systems   Constitutional: Positive for fatigue. Negative for activity change, appetite change, chills, fever, unexpected weight gain and unexpected weight loss.   HENT: Negative for trouble swallowing.    Eyes: Negative for blurred vision and visual disturbance.   Respiratory: Negative for cough, shortness of breath and wheezing.    Cardiovascular: Negative for chest pain, palpitations and leg swelling.   Gastrointestinal: Negative for abdominal distention, abdominal pain, anal bleeding, blood in stool, constipation, diarrhea, nausea, vomiting, GERD and indigestion.   Endocrine: Negative for polyuria.   Genitourinary: Positive for urinary incontinence. Negative for decreased urine volume, dysuria, frequency, hematuria, pelvic pain, pelvic pressure and urgency.   Musculoskeletal: Negative for arthralgias, back pain and joint swelling.   Skin: Positive for skin lesions.   Neurological: Negative for dizziness, weakness, light-headedness, headache, memory problem and confusion.   Psychiatric/Behavioral: Negative for sleep disturbance, depressed mood and stress. The patient is not nervous/anxious.      There were no vitals taken for this visit.      Objective   Physical Exam  Vitals and nursing note reviewed.   Constitutional:       General: She is not in acute distress.     Appearance: Normal appearance. She is normal weight.   HENT:      Head: Normocephalic and atraumatic.      Right Ear: Tympanic membrane, ear canal and external ear normal.      Left Ear: Tympanic membrane, ear canal and external ear normal.      Nose: Nose normal.      Mouth/Throat:      Mouth: Mucous membranes are moist.       Pharynx: Oropharynx is clear.   Eyes:      Extraocular Movements: Extraocular movements intact.      Conjunctiva/sclera: Conjunctivae normal.      Pupils: Pupils are equal, round, and reactive to light.   Neck:      Thyroid: No thyroid mass, thyromegaly or thyroid tenderness.   Cardiovascular:      Rate and Rhythm: Normal rate and regular rhythm.      Heart sounds: Normal heart sounds.   Pulmonary:      Effort: Pulmonary effort is normal. No respiratory distress.      Breath sounds: Normal breath sounds. No wheezing, rhonchi or rales.   Abdominal:      General: Abdomen is flat. Bowel sounds are normal. There is no distension.      Palpations: Abdomen is soft. There is no mass.      Tenderness: There is no abdominal tenderness.   Musculoskeletal:         General: Normal range of motion.      Cervical back: Normal range of motion and neck supple.      Right lower leg: No edema.      Left lower leg: No edema.   Skin:     General: Skin is warm and dry.      Findings: Lesion present.      Comments: Soft tissue papular lesion on left wrist without surrounding erythema and no current drainage   Neurological:      General: No focal deficit present.      Mental Status: She is alert and oriented to person, place, and time.   Psychiatric:         Mood and Affect: Mood normal.         Behavior: Behavior normal.         Thought Content: Thought content normal.         Procedures     Assessment/Plan   Diagnoses and all orders for this visit:    1. Routine physical examination (Primary)  -     Lipid Panel  -     Comprehensive Metabolic Panel  -     TSH  -     CBC & Differential  -     POC Urinalysis Dipstick, Automated  Will check routine labs today and call with results. Encouraged exercise and healthy diet.  She is sleeping well and is up to date on most of her preventative health screenings.Will order cologuard today.  2. Screening cholesterol level  -     Lipid Panel  Will check fasting labs today and call with results.  3.  Screening for diabetes mellitus  -     Comprehensive Metabolic Panel  Will check fasting labs today and call with results. Continue healthy diet and start exercising more as planned.  4. Encounter for hepatitis C screening test for low risk patient  -     Hepatitis C Antibody  Will check today and call with results.  5. Colon cancer screening  -     Cologuard - Stool, Per Rectum; Future  Cologuard ordered.  Pt to complete as soon as possible and will call with results.  6. Vitamin D deficiency  -     Vitamin D 25 Hydroxy  History of vitamin D deficiency.  Will check labs today.  7. Fatigue, unspecified type  -     TSH  -     CBC & Differential  Will check labs to ensure no underlying cause.  8. Stress incontinence  -     POC Urinalysis Dipstick, Automated  Urine checked today.  Normal other than blood present.  Will send to lab to confirm.  Pt to work on Kegel exercises.  9. Hematuria, unspecified type  -     Urinalysis With Microscopic - Urine, Clean Catch  Will send to lab for confirmation.  Other orders  -     Cancel: Mammo Screening Digital Tomosynthesis Bilateral With CAD; Future  -     Cancel: SARS-CoV-2 Antibodies (Roche); Future

## 2021-03-22 ENCOUNTER — OFFICE VISIT (OUTPATIENT)
Dept: FAMILY MEDICINE CLINIC | Facility: CLINIC | Age: 61
End: 2021-03-22

## 2021-03-22 ENCOUNTER — LAB (OUTPATIENT)
Dept: FAMILY MEDICINE CLINIC | Facility: CLINIC | Age: 61
End: 2021-03-22

## 2021-03-22 VITALS
TEMPERATURE: 97.7 F | SYSTOLIC BLOOD PRESSURE: 128 MMHG | BODY MASS INDEX: 25.34 KG/M2 | HEIGHT: 63 IN | DIASTOLIC BLOOD PRESSURE: 83 MMHG | HEART RATE: 50 BPM | OXYGEN SATURATION: 98 % | WEIGHT: 143 LBS

## 2021-03-22 DIAGNOSIS — Z00.00 ROUTINE PHYSICAL EXAMINATION: Primary | ICD-10-CM

## 2021-03-22 DIAGNOSIS — N39.3 STRESS INCONTINENCE: ICD-10-CM

## 2021-03-22 DIAGNOSIS — Z12.11 COLON CANCER SCREENING: ICD-10-CM

## 2021-03-22 DIAGNOSIS — Z13.220 SCREENING CHOLESTEROL LEVEL: ICD-10-CM

## 2021-03-22 DIAGNOSIS — R31.9 HEMATURIA, UNSPECIFIED TYPE: ICD-10-CM

## 2021-03-22 DIAGNOSIS — R53.83 FATIGUE, UNSPECIFIED TYPE: ICD-10-CM

## 2021-03-22 DIAGNOSIS — Z13.1 SCREENING FOR DIABETES MELLITUS: ICD-10-CM

## 2021-03-22 DIAGNOSIS — E55.9 VITAMIN D DEFICIENCY: ICD-10-CM

## 2021-03-22 DIAGNOSIS — Z11.59 ENCOUNTER FOR HEPATITIS C SCREENING TEST FOR LOW RISK PATIENT: ICD-10-CM

## 2021-03-22 LAB
25(OH)D3 SERPL-MCNC: 57.6 NG/ML (ref 30–100)
ALBUMIN SERPL-MCNC: 4.3 G/DL (ref 3.5–5.2)
ALBUMIN/GLOB SERPL: 2 G/DL
ALP SERPL-CCNC: 52 U/L (ref 39–117)
ALT SERPL W P-5'-P-CCNC: 11 U/L (ref 1–33)
ANION GAP SERPL CALCULATED.3IONS-SCNC: 7.5 MMOL/L (ref 5–15)
AST SERPL-CCNC: 17 U/L (ref 1–32)
BACTERIA UR QL AUTO: NORMAL /HPF
BASOPHILS # BLD AUTO: 0.04 10*3/MM3 (ref 0–0.2)
BASOPHILS NFR BLD AUTO: 0.7 % (ref 0–1.5)
BILIRUB BLD-MCNC: NEGATIVE MG/DL
BILIRUB SERPL-MCNC: 0.6 MG/DL (ref 0–1.2)
BILIRUB UR QL STRIP: NEGATIVE
BUN SERPL-MCNC: 11 MG/DL (ref 6–20)
BUN/CREAT SERPL: 16.2 (ref 7–25)
CALCIUM SPEC-SCNC: 9.1 MG/DL (ref 8.6–10.5)
CHLORIDE SERPL-SCNC: 104 MMOL/L (ref 98–107)
CHOLEST SERPL-MCNC: 174 MG/DL (ref 0–200)
CLARITY UR: CLEAR
CLARITY, POC: CLEAR
CO2 SERPL-SCNC: 29.5 MMOL/L (ref 22–29)
COLOR UR: YELLOW
COLOR UR: YELLOW
CREAT SERPL-MCNC: 0.68 MG/DL (ref 0.57–1)
DEPRECATED RDW RBC AUTO: 39.1 FL (ref 37–54)
EOSINOPHIL # BLD AUTO: 0.21 10*3/MM3 (ref 0–0.4)
EOSINOPHIL NFR BLD AUTO: 3.9 % (ref 0.3–6.2)
ERYTHROCYTE [DISTWIDTH] IN BLOOD BY AUTOMATED COUNT: 11.9 % (ref 12.3–15.4)
GFR SERPL CREATININE-BSD FRML MDRD: 89 ML/MIN/1.73
GLOBULIN UR ELPH-MCNC: 2.1 GM/DL
GLUCOSE SERPL-MCNC: 89 MG/DL (ref 65–99)
GLUCOSE UR STRIP-MCNC: NEGATIVE MG/DL
GLUCOSE UR STRIP-MCNC: NEGATIVE MG/DL
HCT VFR BLD AUTO: 39.1 % (ref 34–46.6)
HCV AB SER DONR QL: NORMAL
HDLC SERPL-MCNC: 89 MG/DL (ref 40–60)
HGB BLD-MCNC: 13.2 G/DL (ref 12–15.9)
HGB UR QL STRIP.AUTO: ABNORMAL
HYALINE CASTS UR QL AUTO: NORMAL /LPF
IMM GRANULOCYTES # BLD AUTO: 0.02 10*3/MM3 (ref 0–0.05)
IMM GRANULOCYTES NFR BLD AUTO: 0.4 % (ref 0–0.5)
KETONES UR QL STRIP: NEGATIVE
KETONES UR QL: NEGATIVE
LDLC SERPL CALC-MCNC: 74 MG/DL (ref 0–100)
LDLC/HDLC SERPL: 0.83 {RATIO}
LEUKOCYTE EST, POC: NEGATIVE
LEUKOCYTE ESTERASE UR QL STRIP.AUTO: NEGATIVE
LYMPHOCYTES # BLD AUTO: 1.69 10*3/MM3 (ref 0.7–3.1)
LYMPHOCYTES NFR BLD AUTO: 31.4 % (ref 19.6–45.3)
MCH RBC QN AUTO: 30.6 PG (ref 26.6–33)
MCHC RBC AUTO-ENTMCNC: 33.8 G/DL (ref 31.5–35.7)
MCV RBC AUTO: 90.7 FL (ref 79–97)
MONOCYTES # BLD AUTO: 0.37 10*3/MM3 (ref 0.1–0.9)
MONOCYTES NFR BLD AUTO: 6.9 % (ref 5–12)
NEUTROPHILS NFR BLD AUTO: 3.06 10*3/MM3 (ref 1.7–7)
NEUTROPHILS NFR BLD AUTO: 56.7 % (ref 42.7–76)
NITRITE UR QL STRIP: NEGATIVE
NITRITE UR-MCNC: NEGATIVE MG/ML
NRBC BLD AUTO-RTO: 0 /100 WBC (ref 0–0.2)
PH UR STRIP.AUTO: 6.5 [PH] (ref 5–8)
PH UR: 6.5 [PH] (ref 5–8)
PLATELET # BLD AUTO: 227 10*3/MM3 (ref 140–450)
PMV BLD AUTO: 10.9 FL (ref 6–12)
POTASSIUM SERPL-SCNC: 4.3 MMOL/L (ref 3.5–5.2)
PROT SERPL-MCNC: 6.4 G/DL (ref 6–8.5)
PROT UR QL STRIP: NEGATIVE
PROT UR STRIP-MCNC: NEGATIVE MG/DL
RBC # BLD AUTO: 4.31 10*6/MM3 (ref 3.77–5.28)
RBC # UR STRIP: ABNORMAL /UL
RBC # UR: NORMAL /HPF
REF LAB TEST METHOD: NORMAL
SODIUM SERPL-SCNC: 141 MMOL/L (ref 136–145)
SP GR UR STRIP: 1.01 (ref 1–1.03)
SP GR UR: 1 (ref 1–1.03)
SQUAMOUS #/AREA URNS HPF: NORMAL /HPF
TRIGL SERPL-MCNC: 55 MG/DL (ref 0–150)
TSH SERPL DL<=0.05 MIU/L-ACNC: 1.33 UIU/ML (ref 0.27–4.2)
UROBILINOGEN UR QL STRIP: ABNORMAL
UROBILINOGEN UR QL: NORMAL
VLDLC SERPL-MCNC: 11 MG/DL (ref 5–40)
WBC # BLD AUTO: 5.39 10*3/MM3 (ref 3.4–10.8)
WBC UR QL AUTO: NORMAL /HPF

## 2021-03-22 PROCEDURE — 99396 PREV VISIT EST AGE 40-64: CPT | Performed by: PHYSICIAN ASSISTANT

## 2021-03-22 PROCEDURE — 81001 URINALYSIS AUTO W/SCOPE: CPT | Performed by: PHYSICIAN ASSISTANT

## 2021-03-22 PROCEDURE — 84443 ASSAY THYROID STIM HORMONE: CPT | Performed by: PHYSICIAN ASSISTANT

## 2021-03-22 PROCEDURE — 80053 COMPREHEN METABOLIC PANEL: CPT | Performed by: PHYSICIAN ASSISTANT

## 2021-03-22 PROCEDURE — 82306 VITAMIN D 25 HYDROXY: CPT | Performed by: PHYSICIAN ASSISTANT

## 2021-03-22 PROCEDURE — 86803 HEPATITIS C AB TEST: CPT | Performed by: PHYSICIAN ASSISTANT

## 2021-03-22 PROCEDURE — 81003 URINALYSIS AUTO W/O SCOPE: CPT | Performed by: PHYSICIAN ASSISTANT

## 2021-03-22 PROCEDURE — 85025 COMPLETE CBC W/AUTO DIFF WBC: CPT | Performed by: PHYSICIAN ASSISTANT

## 2021-03-22 PROCEDURE — 80061 LIPID PANEL: CPT | Performed by: PHYSICIAN ASSISTANT

## 2021-03-22 NOTE — PATIENT INSTRUCTIONS
Will check labs today and call you will results.  Cologuard has been ordered and will be mailed to you.  Please complete this as soon as possible.  We will call you with results.

## 2021-10-05 ENCOUNTER — TELEPHONE (OUTPATIENT)
Dept: FAMILY MEDICINE CLINIC | Facility: CLINIC | Age: 61
End: 2021-10-05

## 2021-10-05 NOTE — TELEPHONE ENCOUNTER
Caller: Natividad Messer    Relationship: Self    Best call back number: [phone]    What is the best time to reach you: ANY    Who are you requesting to speak with (clinical staff, provider,  specific staff member): CLINICAL/ PROVIDER    Do you require a callback: YES.  PATIENT IS CALLING TO SEE IF DR. APPLE HAS RECEIVED THE MEDICAL CLEARANCE FORM FROM DR. CHINO MONTEMAYOR ON 11/3/2021  PLEASE FAX A CLEARANCE LETTER -[phone]  /  OFFICE NUMBER [phone].  .”

## 2021-10-13 ENCOUNTER — TELEPHONE (OUTPATIENT)
Dept: FAMILY MEDICINE CLINIC | Facility: CLINIC | Age: 61
End: 2021-10-13

## 2021-10-13 NOTE — TELEPHONE ENCOUNTER
PATIENT IS CALLING IN REGARDS TO A SURGICAL CLEARANCE FORM THAT WAS SUPPOSED TO BE SENT TO DR. CHINO MONTEMAYOR.    THE PATIENT WAS CONTACTED BY DR. MONTEMAYOR' OFFICE TODAY (10/13/21) AND TOLD THAT IF THEY DON'T HAVE THIS FORM TODAY THEY WILL HAVE TO CANCEL THE SURGERY.    PATIENT IS VERY UPSET AND DOESN'T UNDERSTAND WHY THIS HASN'T BEEN TAKEN CARE OF YET.    PLEASE ADVISE  [phone]

## 2021-10-13 NOTE — TELEPHONE ENCOUNTER
Called pt and left her a voicemail that clearance letter was faxed to Dr. Li office on 10/5/21. I will fax again today and asked her to call them and let them know I am faxing again and to let me know if they don't receive.     Pt called into office and I gave her information.     Clearance letter faxed again to Dr. Li at [phone]

## 2022-02-08 NOTE — PROGRESS NOTES
EMG and Nerve Conduction Studies    The complete report includes the data sheets.     Date of Study:2/11/22    Referring Provider:DR. BROWN    History:BUE-CTS    Results:    1.  The right median sensory study was attempted with no response recorded.  The right median motor distal latency is markedly prolonged with a very questionable compound muscle action potential recorded.  Due to the severity of the neuropathy of forearm conduction velocity cannot be determined.  Electromyography of the abductor pollicis brevis muscle showed fibrillations positive sharp waves and a discrete motor unit firing pattern.    2.  The left median sensory and motor distal latencies were prolonged with normal median motor forearm conduction velocity.  Electromyography of the left abductor pollicis brevis muscle showed only minor neurogenic change with increased insertional activity.    3.  The ulnar sensory and motor nerve conduction studies were normal bilaterally.    4.  EMG of the muscles examined in the bilateral C5-T1 myotomes were normal except for the abductor pollicis brevis muscles.    Impression:    This is an abnormal study.  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  The localization of a focal neuropathy cannot be determined due to the severity of the neuropathy though is most probably at the wrist.  There is evidence of moderate left median neuropathy at the wrist.      Electronically signed by :    Joseph Seipel, M.D.  February 15, 2022

## 2022-02-11 ENCOUNTER — PROCEDURE VISIT (OUTPATIENT)
Dept: NEUROLOGY | Facility: CLINIC | Age: 62
End: 2022-02-11

## 2022-02-11 VITALS
DIASTOLIC BLOOD PRESSURE: 84 MMHG | WEIGHT: 143 LBS | SYSTOLIC BLOOD PRESSURE: 128 MMHG | TEMPERATURE: 97.3 F | HEIGHT: 63 IN | HEART RATE: 76 BPM | BODY MASS INDEX: 25.34 KG/M2

## 2022-02-11 DIAGNOSIS — G56.03 BILATERAL CARPAL TUNNEL SYNDROME: Primary | ICD-10-CM

## 2022-02-11 PROCEDURE — 95886 MUSC TEST DONE W/N TEST COMP: CPT | Performed by: PSYCHIATRY & NEUROLOGY

## 2022-02-11 PROCEDURE — 95910 NRV CNDJ TEST 7-8 STUDIES: CPT | Performed by: PSYCHIATRY & NEUROLOGY

## 2022-07-07 ENCOUNTER — TRANSCRIBE ORDERS (OUTPATIENT)
Dept: ADMINISTRATIVE | Facility: HOSPITAL | Age: 62
End: 2022-07-07

## 2022-07-07 ENCOUNTER — HOSPITAL ENCOUNTER (OUTPATIENT)
Dept: GENERAL RADIOLOGY | Facility: HOSPITAL | Age: 62
Discharge: HOME | End: 2022-07-07

## 2022-07-07 DIAGNOSIS — R76.12 REACTION TO QUANTIFERON-TB TEST (QFT) WITHOUT ACTIVE TUBERCULOSIS: Primary | ICD-10-CM

## 2022-07-07 DIAGNOSIS — R76.12 REACTION TO QUANTIFERON-TB TEST (QFT) WITHOUT ACTIVE TUBERCULOSIS: ICD-10-CM

## 2022-07-07 PROCEDURE — 71045 X-RAY EXAM CHEST 1 VIEW: CPT

## 2022-10-17 ENCOUNTER — TRANSCRIBE ORDERS (OUTPATIENT)
Dept: ADMINISTRATIVE | Facility: HOSPITAL | Age: 62
End: 2022-10-17

## 2022-10-17 ENCOUNTER — HOSPITAL ENCOUNTER (OUTPATIENT)
Dept: MAMMOGRAPHY | Facility: HOSPITAL | Age: 62
Discharge: HOME | End: 2022-10-17
Admitting: FAMILY MEDICINE

## 2022-10-17 DIAGNOSIS — Z12.31 ENCOUNTER FOR SCREENING MAMMOGRAM FOR MALIGNANT NEOPLASM OF BREAST: Primary | ICD-10-CM

## 2022-10-17 DIAGNOSIS — Z12.31 ENCOUNTER FOR SCREENING MAMMOGRAM FOR MALIGNANT NEOPLASM OF BREAST: ICD-10-CM

## 2022-10-17 PROCEDURE — 77067 SCR MAMMO BI INCL CAD: CPT

## 2022-10-17 PROCEDURE — 77063 BREAST TOMOSYNTHESIS BI: CPT

## 2022-10-27 ENCOUNTER — LAB (OUTPATIENT)
Dept: LAB | Facility: HOSPITAL | Age: 62
End: 2022-10-27

## 2022-10-27 ENCOUNTER — TRANSCRIBE ORDERS (OUTPATIENT)
Dept: ADMINISTRATIVE | Facility: HOSPITAL | Age: 62
End: 2022-10-27

## 2022-10-27 DIAGNOSIS — E55.9 VITAMIN D DEFICIENCY: ICD-10-CM

## 2022-10-27 DIAGNOSIS — F34.89 OTHER SPECIFIED PERSISTENT MOOD DISORDERS: ICD-10-CM

## 2022-10-27 DIAGNOSIS — G44.52 NEW DAILY PERSISTENT HEADACHE: ICD-10-CM

## 2022-10-27 DIAGNOSIS — E56.9 VITAMIN DEFICIENCY: ICD-10-CM

## 2022-10-27 DIAGNOSIS — R53.83 OTHER FATIGUE: ICD-10-CM

## 2022-10-27 DIAGNOSIS — G47.9 DISTURBANCE, SLEEP: ICD-10-CM

## 2022-10-27 DIAGNOSIS — E61.8 DEFICIENCY OF OTHER SPECIFIED NUTRIENT ELEMENTS: Primary | ICD-10-CM

## 2022-10-27 DIAGNOSIS — E61.8 DEFICIENCY OF OTHER SPECIFIED NUTRIENT ELEMENTS: ICD-10-CM

## 2022-10-27 DIAGNOSIS — E01.2: ICD-10-CM

## 2022-10-27 DIAGNOSIS — E63.9 NUTRITIONAL DEFICIENCY: ICD-10-CM

## 2022-10-27 LAB — HOLD SPECIMEN: NORMAL

## 2022-10-27 PROCEDURE — 84144 ASSAY OF PROGESTERONE: CPT

## 2022-10-27 PROCEDURE — 82746 ASSAY OF FOLIC ACID SERUM: CPT

## 2022-10-27 PROCEDURE — 84140 ASSAY OF PREGNENOLONE: CPT

## 2022-10-27 PROCEDURE — 82306 VITAMIN D 25 HYDROXY: CPT

## 2022-10-27 PROCEDURE — 84481 FREE ASSAY (FT-3): CPT

## 2022-10-27 PROCEDURE — 84443 ASSAY THYROID STIM HORMONE: CPT

## 2022-10-27 PROCEDURE — 83789 MASS SPECTROMETRY QUAL/QUAN: CPT

## 2022-10-27 PROCEDURE — 83540 ASSAY OF IRON: CPT

## 2022-10-27 PROCEDURE — 82728 ASSAY OF FERRITIN: CPT

## 2022-10-27 PROCEDURE — 82670 ASSAY OF TOTAL ESTRADIOL: CPT

## 2022-10-27 PROCEDURE — 84439 ASSAY OF FREE THYROXINE: CPT

## 2022-10-27 PROCEDURE — 82607 VITAMIN B-12: CPT

## 2022-10-27 PROCEDURE — 84403 ASSAY OF TOTAL TESTOSTERONE: CPT

## 2022-10-27 PROCEDURE — 82627 DEHYDROEPIANDROSTERONE: CPT

## 2022-10-27 PROCEDURE — 84466 ASSAY OF TRANSFERRIN: CPT

## 2022-10-27 PROCEDURE — 36415 COLL VENOUS BLD VENIPUNCTURE: CPT

## 2022-10-28 LAB
25(OH)D3 SERPL-MCNC: 141 NG/ML (ref 30–100)
ESTRADIOL SERPL HS-MCNC: 42.2 PG/ML
FERRITIN SERPL-MCNC: 86.9 NG/ML (ref 13–150)
FOLATE SERPL-MCNC: >20 NG/ML (ref 4.78–24.2)
IRON 24H UR-MRATE: 41 MCG/DL (ref 37–145)
IRON SATN MFR SERPL: 13 % (ref 20–50)
PROGEST SERPL-MCNC: 1.13 NG/ML
T3FREE SERPL-MCNC: 2.48 PG/ML (ref 2–4.4)
T4 FREE SERPL-MCNC: 1.23 NG/DL (ref 0.93–1.7)
TESTOST SERPL-MCNC: 19.8 NG/DL (ref 2.9–40.8)
TIBC SERPL-MCNC: 304 MCG/DL (ref 298–536)
TRANSFERRIN SERPL-MCNC: 204 MG/DL (ref 200–360)
TSH SERPL DL<=0.05 MIU/L-ACNC: 1.56 UIU/ML (ref 0.27–4.2)
VIT B12 BLD-MCNC: 1751 PG/ML (ref 211–946)

## 2022-10-29 LAB — DHEA-S SERPL-MCNC: 201 UG/DL (ref 29.4–220.5)

## 2022-10-31 LAB — IODINE SERPL-MCNC: 48.8 UG/L (ref 40–92)

## 2022-11-04 LAB — PREG SERPL-MCNC: 89 NG/DL

## 2023-03-31 ENCOUNTER — TRANSCRIBE ORDERS (OUTPATIENT)
Dept: ADMINISTRATIVE | Facility: HOSPITAL | Age: 63
End: 2023-03-31
Payer: COMMERCIAL

## 2023-03-31 ENCOUNTER — LAB (OUTPATIENT)
Dept: LAB | Facility: HOSPITAL | Age: 63
End: 2023-03-31
Payer: COMMERCIAL

## 2023-03-31 DIAGNOSIS — D64.9 ANEMIA, UNSPECIFIED TYPE: ICD-10-CM

## 2023-03-31 DIAGNOSIS — E56.9 MULTIPLE VITAMIN DEFICIENCY DISEASE: ICD-10-CM

## 2023-03-31 DIAGNOSIS — E61.8 IODINE DEFICIENCY DISEASE, NON GOITER: ICD-10-CM

## 2023-03-31 DIAGNOSIS — R53.83 TIREDNESS: ICD-10-CM

## 2023-03-31 DIAGNOSIS — E55.9 VITAMIN D DEFICIENCY: ICD-10-CM

## 2023-03-31 DIAGNOSIS — E55.9 VITAMIN D DEFICIENCY: Primary | ICD-10-CM

## 2023-03-31 LAB
25(OH)D3 SERPL-MCNC: 58.5 NG/ML (ref 30–100)
BASOPHILS # BLD AUTO: 0.04 10*3/MM3 (ref 0–0.2)
BASOPHILS NFR BLD AUTO: 0.7 % (ref 0–1.5)
DEPRECATED RDW RBC AUTO: 41.8 FL (ref 37–54)
EOSINOPHIL # BLD AUTO: 0.25 10*3/MM3 (ref 0–0.4)
EOSINOPHIL NFR BLD AUTO: 4.7 % (ref 0.3–6.2)
ERYTHROCYTE [DISTWIDTH] IN BLOOD BY AUTOMATED COUNT: 12.3 % (ref 12.3–15.4)
FERRITIN SERPL-MCNC: 90 NG/ML (ref 13–150)
FOLATE SERPL-MCNC: 10.2 NG/ML (ref 4.78–24.2)
HCT VFR BLD AUTO: 37.2 % (ref 34–46.6)
HGB BLD-MCNC: 12 G/DL (ref 12–15.9)
IMM GRANULOCYTES # BLD AUTO: 0.01 10*3/MM3 (ref 0–0.05)
IMM GRANULOCYTES NFR BLD AUTO: 0.2 % (ref 0–0.5)
IRON 24H UR-MRATE: 49 MCG/DL (ref 37–145)
IRON SATN MFR SERPL: 16 % (ref 20–50)
LYMPHOCYTES # BLD AUTO: 1.65 10*3/MM3 (ref 0.7–3.1)
LYMPHOCYTES NFR BLD AUTO: 30.9 % (ref 19.6–45.3)
MCH RBC QN AUTO: 30.1 PG (ref 26.6–33)
MCHC RBC AUTO-ENTMCNC: 32.3 G/DL (ref 31.5–35.7)
MCV RBC AUTO: 93.2 FL (ref 79–97)
MONOCYTES # BLD AUTO: 0.38 10*3/MM3 (ref 0.1–0.9)
MONOCYTES NFR BLD AUTO: 7.1 % (ref 5–12)
NEUTROPHILS NFR BLD AUTO: 3.01 10*3/MM3 (ref 1.7–7)
NEUTROPHILS NFR BLD AUTO: 56.4 % (ref 42.7–76)
NRBC BLD AUTO-RTO: 0 /100 WBC (ref 0–0.2)
PLATELET # BLD AUTO: 233 10*3/MM3 (ref 140–450)
PMV BLD AUTO: 10.5 FL (ref 6–12)
RBC # BLD AUTO: 3.99 10*6/MM3 (ref 3.77–5.28)
TIBC SERPL-MCNC: 298 MCG/DL (ref 298–536)
TRANSFERRIN SERPL-MCNC: 200 MG/DL (ref 200–360)
VIT B12 BLD-MCNC: 1533 PG/ML (ref 211–946)
WBC NRBC COR # BLD: 5.34 10*3/MM3 (ref 3.4–10.8)

## 2023-03-31 PROCEDURE — 82728 ASSAY OF FERRITIN: CPT

## 2023-03-31 PROCEDURE — 83540 ASSAY OF IRON: CPT

## 2023-03-31 PROCEDURE — 82746 ASSAY OF FOLIC ACID SERUM: CPT

## 2023-03-31 PROCEDURE — 82306 VITAMIN D 25 HYDROXY: CPT

## 2023-03-31 PROCEDURE — 84466 ASSAY OF TRANSFERRIN: CPT

## 2023-03-31 PROCEDURE — 82607 VITAMIN B-12: CPT

## 2023-03-31 PROCEDURE — 85025 COMPLETE CBC W/AUTO DIFF WBC: CPT

## 2023-03-31 PROCEDURE — 36415 COLL VENOUS BLD VENIPUNCTURE: CPT

## 2023-10-11 ENCOUNTER — TRANSCRIBE ORDERS (OUTPATIENT)
Dept: ADMINISTRATIVE | Facility: HOSPITAL | Age: 63
End: 2023-10-11
Payer: COMMERCIAL

## 2023-10-11 DIAGNOSIS — Z12.31 VISIT FOR SCREENING MAMMOGRAM: Primary | ICD-10-CM

## 2023-10-19 ENCOUNTER — HOSPITAL ENCOUNTER (OUTPATIENT)
Dept: MAMMOGRAPHY | Facility: HOSPITAL | Age: 63
Discharge: HOME | End: 2023-10-19
Admitting: FAMILY MEDICINE
Payer: COMMERCIAL

## 2023-10-19 DIAGNOSIS — Z12.31 VISIT FOR SCREENING MAMMOGRAM: ICD-10-CM

## 2023-10-19 PROCEDURE — 77063 BREAST TOMOSYNTHESIS BI: CPT

## 2023-10-19 PROCEDURE — 77067 SCR MAMMO BI INCL CAD: CPT

## 2023-11-06 ENCOUNTER — TRANSCRIBE ORDERS (OUTPATIENT)
Dept: ADMINISTRATIVE | Facility: HOSPITAL | Age: 63
End: 2023-11-06
Payer: COMMERCIAL

## 2023-11-06 ENCOUNTER — LAB (OUTPATIENT)
Dept: LAB | Facility: HOSPITAL | Age: 63
End: 2023-11-06
Payer: COMMERCIAL

## 2023-11-06 DIAGNOSIS — G47.9 REPETITIVE INTRUSIONS OF SLEEP: ICD-10-CM

## 2023-11-06 DIAGNOSIS — D64.9 ANEMIA, UNSPECIFIED TYPE: Primary | ICD-10-CM

## 2023-11-06 DIAGNOSIS — R53.83 TIREDNESS: ICD-10-CM

## 2023-11-06 DIAGNOSIS — D64.9 ANEMIA, UNSPECIFIED TYPE: ICD-10-CM

## 2023-11-06 LAB
ALBUMIN SERPL-MCNC: 4.3 G/DL (ref 3.5–5.2)
ALBUMIN/GLOB SERPL: 2 G/DL
ALP SERPL-CCNC: 33 U/L (ref 39–117)
ALT SERPL W P-5'-P-CCNC: 15 U/L (ref 1–33)
ANION GAP SERPL CALCULATED.3IONS-SCNC: 8 MMOL/L (ref 5–15)
AST SERPL-CCNC: 20 U/L (ref 1–32)
BASOPHILS # BLD AUTO: 0.05 10*3/MM3 (ref 0–0.2)
BASOPHILS NFR BLD AUTO: 0.9 % (ref 0–1.5)
BILIRUB SERPL-MCNC: 0.7 MG/DL (ref 0–1.2)
BUN SERPL-MCNC: 14 MG/DL (ref 8–23)
BUN/CREAT SERPL: 17.5 (ref 7–25)
CALCIUM SPEC-SCNC: 9.4 MG/DL (ref 8.6–10.5)
CHLORIDE SERPL-SCNC: 105 MMOL/L (ref 98–107)
CO2 SERPL-SCNC: 27 MMOL/L (ref 22–29)
CREAT SERPL-MCNC: 0.8 MG/DL (ref 0.57–1)
DEPRECATED RDW RBC AUTO: 40.5 FL (ref 37–54)
EGFRCR SERPLBLD CKD-EPI 2021: 83.4 ML/MIN/1.73
EOSINOPHIL # BLD AUTO: 0.21 10*3/MM3 (ref 0–0.4)
EOSINOPHIL NFR BLD AUTO: 3.8 % (ref 0.3–6.2)
ERYTHROCYTE [DISTWIDTH] IN BLOOD BY AUTOMATED COUNT: 12.1 % (ref 12.3–15.4)
FERRITIN SERPL-MCNC: 138 NG/ML (ref 13–150)
GLOBULIN UR ELPH-MCNC: 2.2 GM/DL
GLUCOSE SERPL-MCNC: 86 MG/DL (ref 65–99)
HCT VFR BLD AUTO: 39.7 % (ref 34–46.6)
HGB BLD-MCNC: 13.2 G/DL (ref 12–15.9)
IMM GRANULOCYTES # BLD AUTO: 0.01 10*3/MM3 (ref 0–0.05)
IMM GRANULOCYTES NFR BLD AUTO: 0.2 % (ref 0–0.5)
IRON 24H UR-MRATE: 135 MCG/DL (ref 37–145)
IRON SATN MFR SERPL: 46 % (ref 20–50)
LYMPHOCYTES # BLD AUTO: 1.71 10*3/MM3 (ref 0.7–3.1)
LYMPHOCYTES NFR BLD AUTO: 31.2 % (ref 19.6–45.3)
MCH RBC QN AUTO: 30.8 PG (ref 26.6–33)
MCHC RBC AUTO-ENTMCNC: 33.2 G/DL (ref 31.5–35.7)
MCV RBC AUTO: 92.8 FL (ref 79–97)
MONOCYTES # BLD AUTO: 0.48 10*3/MM3 (ref 0.1–0.9)
MONOCYTES NFR BLD AUTO: 8.8 % (ref 5–12)
NEUTROPHILS NFR BLD AUTO: 3.02 10*3/MM3 (ref 1.7–7)
NEUTROPHILS NFR BLD AUTO: 55.1 % (ref 42.7–76)
NRBC BLD AUTO-RTO: 0 /100 WBC (ref 0–0.2)
PLATELET # BLD AUTO: 231 10*3/MM3 (ref 140–450)
PMV BLD AUTO: 10.5 FL (ref 6–12)
POTASSIUM SERPL-SCNC: 3.9 MMOL/L (ref 3.5–5.2)
PROT SERPL-MCNC: 6.5 G/DL (ref 6–8.5)
RBC # BLD AUTO: 4.28 10*6/MM3 (ref 3.77–5.28)
SODIUM SERPL-SCNC: 140 MMOL/L (ref 136–145)
TIBC SERPL-MCNC: 292 MCG/DL (ref 298–536)
TRANSFERRIN SERPL-MCNC: 196 MG/DL (ref 200–360)
WBC NRBC COR # BLD: 5.48 10*3/MM3 (ref 3.4–10.8)

## 2023-11-06 PROCEDURE — 82728 ASSAY OF FERRITIN: CPT

## 2023-11-06 PROCEDURE — 80053 COMPREHEN METABOLIC PANEL: CPT

## 2023-11-06 PROCEDURE — 83540 ASSAY OF IRON: CPT

## 2023-11-06 PROCEDURE — 84466 ASSAY OF TRANSFERRIN: CPT

## 2023-11-06 PROCEDURE — 36415 COLL VENOUS BLD VENIPUNCTURE: CPT

## 2023-11-06 PROCEDURE — 85025 COMPLETE CBC W/AUTO DIFF WBC: CPT

## 2024-04-08 ENCOUNTER — OFFICE (OUTPATIENT)
Dept: URBAN - METROPOLITAN AREA CLINIC 64 | Facility: CLINIC | Age: 64
End: 2024-04-08

## 2024-04-08 VITALS
WEIGHT: 141 LBS | DIASTOLIC BLOOD PRESSURE: 82 MMHG | HEART RATE: 56 BPM | HEIGHT: 62 IN | SYSTOLIC BLOOD PRESSURE: 122 MMHG

## 2024-04-08 DIAGNOSIS — K58.1 IRRITABLE BOWEL SYNDROME WITH CONSTIPATION: ICD-10-CM

## 2024-04-08 DIAGNOSIS — Z12.11 ENCOUNTER FOR SCREENING FOR MALIGNANT NEOPLASM OF COLON: ICD-10-CM

## 2024-04-08 DIAGNOSIS — R14.0 ABDOMINAL DISTENSION (GASEOUS): ICD-10-CM

## 2024-04-08 PROCEDURE — 99203 OFFICE O/P NEW LOW 30 MIN: CPT | Performed by: NURSE PRACTITIONER

## 2024-08-25 ENCOUNTER — ANESTHESIA EVENT (OUTPATIENT)
Dept: GASTROENTEROLOGY | Facility: HOSPITAL | Age: 64
End: 2024-08-25
Payer: COMMERCIAL

## 2024-08-26 ENCOUNTER — HOSPITAL ENCOUNTER (OUTPATIENT)
Facility: HOSPITAL | Age: 64
Setting detail: HOSPITAL OUTPATIENT SURGERY
Discharge: HOME | End: 2024-08-26
Attending: INTERNAL MEDICINE | Admitting: INTERNAL MEDICINE
Payer: COMMERCIAL

## 2024-08-26 ENCOUNTER — ANESTHESIA (OUTPATIENT)
Dept: GASTROENTEROLOGY | Facility: HOSPITAL | Age: 64
End: 2024-08-26
Payer: COMMERCIAL

## 2024-08-26 ENCOUNTER — ON CAMPUS - OUTPATIENT (OUTPATIENT)
Age: 64
End: 2024-08-26
Payer: COMMERCIAL

## 2024-08-26 ENCOUNTER — ON CAMPUS - OUTPATIENT (OUTPATIENT)
Dept: URBAN - METROPOLITAN AREA HOSPITAL 85 | Facility: HOSPITAL | Age: 64
End: 2024-08-26
Payer: COMMERCIAL

## 2024-08-26 VITALS
OXYGEN SATURATION: 100 % | SYSTOLIC BLOOD PRESSURE: 119 MMHG | BODY MASS INDEX: 24.56 KG/M2 | RESPIRATION RATE: 11 BRPM | WEIGHT: 138.6 LBS | TEMPERATURE: 97.8 F | HEIGHT: 63 IN | HEART RATE: 55 BPM | DIASTOLIC BLOOD PRESSURE: 80 MMHG

## 2024-08-26 DIAGNOSIS — K64.8 OTHER HEMORRHOIDS: ICD-10-CM

## 2024-08-26 DIAGNOSIS — K62.1 RECTAL POLYP: ICD-10-CM

## 2024-08-26 DIAGNOSIS — D12.4 BENIGN NEOPLASM OF DESCENDING COLON: ICD-10-CM

## 2024-08-26 DIAGNOSIS — K57.30 DIVERTICULOSIS OF LARGE INTESTINE WITHOUT PERFORATION OR ABS: ICD-10-CM

## 2024-08-26 DIAGNOSIS — Z12.11 ENCOUNTER FOR SCREENING FOR MALIGNANT NEOPLASM OF COLON: ICD-10-CM

## 2024-08-26 DIAGNOSIS — D12.3 BENIGN NEOPLASM OF TRANSVERSE COLON: ICD-10-CM

## 2024-08-26 DIAGNOSIS — Z12.11 SCREENING FOR MALIGNANT NEOPLASM OF COLON: ICD-10-CM

## 2024-08-26 PROCEDURE — 45385 COLONOSCOPY W/LESION REMOVAL: CPT | Mod: 33 | Performed by: INTERNAL MEDICINE

## 2024-08-26 PROCEDURE — 25810000003 SODIUM CHLORIDE 0.9 % SOLUTION

## 2024-08-26 PROCEDURE — 88305 TISSUE EXAM BY PATHOLOGIST: CPT | Performed by: INTERNAL MEDICINE

## 2024-08-26 PROCEDURE — 25010000002 PROPOFOL 10 MG/ML EMULSION

## 2024-08-26 RX ORDER — PROPOFOL 10 MG/ML
VIAL (ML) INTRAVENOUS AS NEEDED
Status: DISCONTINUED | OUTPATIENT
Start: 2024-08-26 | End: 2024-08-26 | Stop reason: SURG

## 2024-08-26 RX ORDER — SODIUM CHLORIDE 9 MG/ML
INJECTION, SOLUTION INTRAVENOUS CONTINUOUS PRN
Status: DISCONTINUED | OUTPATIENT
Start: 2024-08-26 | End: 2024-08-26 | Stop reason: SURG

## 2024-08-26 RX ORDER — LIDOCAINE HYDROCHLORIDE 20 MG/ML
INJECTION, SOLUTION EPIDURAL; INFILTRATION; INTRACAUDAL; PERINEURAL AS NEEDED
Status: DISCONTINUED | OUTPATIENT
Start: 2024-08-26 | End: 2024-08-26 | Stop reason: SURG

## 2024-08-26 RX ADMIN — PROPOFOL 20 MG: 10 INJECTION, EMULSION INTRAVENOUS at 09:34

## 2024-08-26 RX ADMIN — PROPOFOL 30 MG: 10 INJECTION, EMULSION INTRAVENOUS at 09:29

## 2024-08-26 RX ADMIN — PROPOFOL 40 MG: 10 INJECTION, EMULSION INTRAVENOUS at 09:25

## 2024-08-26 RX ADMIN — PROPOFOL 30 MG: 10 INJECTION, EMULSION INTRAVENOUS at 09:38

## 2024-08-26 RX ADMIN — PROPOFOL 90 MG: 10 INJECTION, EMULSION INTRAVENOUS at 09:19

## 2024-08-26 RX ADMIN — PROPOFOL 50 MG: 10 INJECTION, EMULSION INTRAVENOUS at 09:22

## 2024-08-26 RX ADMIN — SODIUM CHLORIDE: 9 INJECTION, SOLUTION INTRAVENOUS at 09:05

## 2024-08-26 RX ADMIN — LIDOCAINE HYDROCHLORIDE 60 MG: 20 INJECTION, SOLUTION EPIDURAL; INFILTRATION; INTRACAUDAL; PERINEURAL at 09:19

## 2024-08-26 RX ADMIN — PROPOFOL 40 MG: 10 INJECTION, EMULSION INTRAVENOUS at 09:26

## 2024-08-26 RX ADMIN — PROPOFOL 30 MG: 10 INJECTION, EMULSION INTRAVENOUS at 09:36

## 2024-08-26 RX ADMIN — PROPOFOL 20 MG: 10 INJECTION, EMULSION INTRAVENOUS at 09:32

## 2024-08-26 NOTE — ANESTHESIA POSTPROCEDURE EVALUATION
Patient: Natividad Messer    Procedure Summary       Date: 08/26/24 Room / Location: [facility] ENDOSCOPY 1 / [facility] ENDOSCOPY    Anesthesia Start: 0905 Anesthesia Stop: 0944    Procedure: COLONOSCOPY with POLYPECTOMY X5 (COLD SNARE) Diagnosis:       Screening for malignant neoplasm of colon      (Screening for malignant neoplasm of colon [Z12.11])    Surgeons: TETO Mcdonald MD Provider: Isaiah Perry MD    Anesthesia Type: general ASA Status: 2            Anesthesia Type: general    Vitals  Vitals Value Taken Time   /80 08/26/24 1001   Temp     Pulse 55 08/26/24 1003   Resp 11 08/26/24 0952   SpO2 99 % 08/26/24 1003   Vitals shown include unfiled device data.        Post Anesthesia Care and Evaluation    Patient location during evaluation: PHASE II  Patient participation: complete - patient participated  Level of consciousness: awake  Pain scale: See nurse's notes for pain score.  Pain management: adequate    Airway patency: patent  Anesthetic complications: No anesthetic complications  PONV Status: none  Cardiovascular status: acceptable  Respiratory status: acceptable and spontaneous ventilation  Hydration status: acceptable    Comments: Patient seen and examined postoperatively; vital signs stable; SpO2 greater than or equal to 90%; cardiopulmonary status stable; nausea/vomiting adequately controlled; pain adequately controlled; no apparent anesthesia complications; patient discharged from anesthesia care when discharge criteria were met

## 2024-08-26 NOTE — OP NOTE
COLONOSCOPY Procedure Report    Patient Name:  Natividad Messer  YOB: 1960    Date of Surgery:  8/26/2024     Pre-Op Diagnosis:  Screening for malignant neoplasm of colon [Z12.11]       Post-Op Diagnosis Codes:     * Screening for malignant neoplasm of colon [Z12.11]    Postop diagnosis:  1.  Colon polyps  2.  Diverticulosis  3.  Internal hemorrhoids    Procedure/CPT® Codes:      Procedure(s):  COLONOSCOPY with POLYPECTOMY X5 (COLD SNARE)    Staff:  Surgeon(s):  TETO Mcdonald MD      Anesthesia: Monitored Anesthesia Care    Description of Procedure:  A description of the procedure as well as risks, benefits and alternative methods were explained to the patient who voiced understanding and signed the corresponding consent form. A physical exam was performed and vital signs were monitored throughout the procedure.    A rectal exam was performed which was normal. An Olympus colonoscope was placed into the rectum and proceeded under direct visualization through the colon until the cecum and appendiceal orifice were identified. Careful visualization occurred upon slow withdraw of the scope. The scope was then retroflexed and the distal rectum was visualized. The quality of the prep was good. The procedure was not difficult and there were no immediate complications.  There was no blood loss.    Impression:  1.  5 colon polyps which appeared benign, between 3 and 6 mm in diameter, mixed sessile and flat appearance, and all removed in a single piece with cold snare polypectomy and completely removed.  3 polyps in the transverse colon.  1 polyp in the descending colon.  1 polyp in the rectum.  2.  Severe diverticulosis of the transverse and descending and sigmoid colon with numerous small and medium size openings with no bleeding  3.  Medium size nonbleeding internal hemorrhoids  4.  Normal mucosa in the terminal ileum    Recommendations:  -Recall for colonoscopy based on pathology. Repeat colonoscopy in: 3  years if 3 or more adenomatous polyps or polyp over 10 mm; 5 years if sessile serrated adenoma; 7 years if 1 or 2 adenomatous polyps; otherwise 10 years unless needed sooner  -Polyp prevention education  -Fiber supplements daily if constipation  -Give hemorrhoid banding education, and if interested okay to schedule hemorrhoid banding in the office--she describes symptomatic hemorrhoids      RSuellen Mcdonald MD     Date: 8/26/2024    Time: 09:43 EDT

## 2024-08-26 NOTE — H&P
GI CONSULT  NOTE:    Referring Provider:    Sara Powell APRN R Tyler Luckett, MD    Chief complaint: <principal problem not specified>    Subjective .       Pre op diagnosis  Screening for malignant neoplasm of colon [Z12.11]      History of present illness:      Natividad Messer is a 64 y.o. female who presents today for Procedure(s):  COLONOSCOPY for the indications listed below.     The updated Patient Profile was reviewed prior to the procedure, in conjunction with the Physical Exam, including medical conditions, surgical procedures, medications, allergies, family history and social history.     Pre-operatively, I reviewed the indication(s) for the procedure, the risks of the procedure [including but not limited to: unexpected bleeding possibly requiring hospitalization and/or unplanned repeat procedures, perforation possibly requiring surgical treatment, missed lesions and complications of sedation/MAC (also explained by anesthesia staff)].     I have evaluated the patient for risks associated with the planned anesthesia and the procedure to be performed and find the patient an acceptable candidate for IV sedation.    Multiple opportunities were provided for any questions or concerns, and all questions were answered satisfactorily before any anesthesia was administered. We will proceed with the planned procedure.    Past Medical History:  Past Medical History:   Diagnosis Date    Degenerative disc disease, lumbar     External hemorrhoid        Past Surgical History:  Past Surgical History:   Procedure Laterality Date    ABDOMINOPLASTY      second in     BREAST SURGERY       SECTION      HYSTERECTOMY      partial    TUBAL ABDOMINAL LIGATION         Social History:  Social History     Tobacco Use    Smoking status: Former     Current packs/day: 1.50     Average packs/day: 1.5 packs/day for 33.7 years (50.5 ttl pk-yrs)     Types: Cigarettes     Start date:     Smokeless tobacco:  Never   Vaping Use    Vaping status: Never Used   Substance Use Topics    Alcohol use: Yes     Alcohol/week: 2.0 standard drinks of alcohol     Types: 2 Glasses of wine per week     Comment: social, wine or mixed drink    Drug use: No       Family History:  Family History   Problem Relation Age of Onset    Heart attack Mother     Heart disease Father        Medications:  Medications Prior to Admission   Medication Sig Dispense Refill Last Dose    Cholecalciferol (VITAMIN D3 PO) Take  by mouth.   Past Week    estradiol-levonorgestrel (Climara Pro) 0.045-0.015 MG/DAY Apply 1 (one) Patch Weekly on skin once a week. Apply to clean, dry area on lower abdomen (avoid breasts, waistline). Rotate application sites. 4 patch 12 Past Week    NON FORMULARY Insert 1 dose into the vagina Daily. Estradiol compounded caplet       Prenatal Vit-Fe Fumarate-FA (PRENATAL VITAMIN PO) Take 1 tablet by mouth Daily.   Past Week    cephalexin (KEFLEX) 500 MG capsule Take 1 capsule by mouth Every 6 (Six) Hours. 21 capsule 0     estradiol (ESTRACE) 0.1 MG/GM vaginal cream Apply one-half gram to vaginal opening once daily. 42.5 g 7     eszopiclone (Lunesta) 1 MG tablet Take 1 tablet by mouth nightly 7 tablet 0     montelukast (Singulair) 10 MG tablet Take 1 tablet by mouth once daily 90 tablet 3     phenazopyridine (PYRIDIUM) 100 MG tablet Take 1 tablet by mouth 3 (Three) Times a Day As Needed for Bladder Spasms. 10 tablet 0     polyethylene glycol (MiraLax) 17 GM/SCOOP powder Take as directed per instructions for bowel prep 238 g 0     promethazine (PHENERGAN) 25 MG tablet Take 1 tablet by mouth every 6 hours 10 tablet 0     sorbitol 70 % solution solution Take 100 ml by mouth as directed for 1 day 100 mL 0     TESTOSTERONE COMPOUNDING KIT TD Insert 0.5 mg into the vagina Daily.       traMADol (ULTRAM) 50 MG tablet Take 1 tablet by mouth every 6 hours 20 tablet 0        Scheduled Meds:  Continuous Infusions:No current facility-administered  "medications for this encounter.    PRN Meds:.    ALLERGIES:  Morphine    ROS:  The following systems were reviewed and negative;  Constitution:  No fevers, chills, no unintentional weight loss  Skin: no rash, no jaundice  Eyes:  No blurry vision, no eye pain  HENT:  No change in hearing or smell  Resp:  No dyspnea or cough  CV:  No chest pain or palpitations  :  No dysuria, hematuria  Musculoskeletal:  No leg cramps or arthralgias  Neuro:  No tremor, no numbness  Psych:  No depression or confsusion    Objective     Vital Signs:   Vitals:    08/15/24 1530 08/26/24 0837   BP:  139/87   BP Location:  Left arm   Patient Position:  Lying   Pulse:  66   Resp:  16   Temp:  97.8 °F (36.6 °C)   TempSrc:  Oral   SpO2:  100%   Weight: 63.5 kg (140 lb) 62.9 kg (138 lb 9.6 oz)   Height: 158.8 cm (62.5\") 158.8 cm (62.5\")       Physical Exam:       General Appearance:    Awake and alert, in no acute distress   Head:    Normocephalic, without obvious abnormality, atraumatic   Throat:   No oral lesions, no thrush, oral mucosa moist   Lungs:     respirations regular, even and unlabored   Skin:   No rash, no jaundice       Results Review:  Lab Results (last 24 hours)       ** No results found for the last 24 hours. **            Imaging Results (Last 24 Hours)       ** No results found for the last 24 hours. **             I reviewed the patient's labs and imaging.    ASSESSMENT AND PLAN:      Active Problems:    * No active hospital problems. *       Procedure(s):  COLONOSCOPY      I discussed the patient's findings and my recommendations with the patient.    SAM Mcdonald MD  08/26/24  09:18 EDT                "

## 2024-08-26 NOTE — DISCHARGE INSTRUCTIONS
Recommendations:  -Recall for colonoscopy based on pathology. Repeat colonoscopy in: 3 years if 3 or more adenomatous polyps or polyp over 10 mm; 5 years if sessile serrated adenoma; 7 years if 1 or 2 adenomatous polyps; otherwise 10 years unless needed sooner  -Polyp prevention education  -Fiber supplements daily if constipation  -Give hemorrhoid banding education, and if interested okay to schedule hemorrhoid banding in the office--she describes symptomatic hemorrhoids    A responsible adult should stay with you and you should rest quietly for the rest of the day.    Do not drink alcohol, drive, operate any heavy machinery or power tools or make any legal/important decisions for the next 24 hours.     Progress your diet as tolerated.  If you begin to experience severe pain, increased shortness of breath, racing heartbeat or a fever above 101 F, seek immediate medical attention.     Follow up with MD as instructed. Call office for results in 3 to 5 business days if needed.      Dr. Mcdonald [phone]

## 2024-08-26 NOTE — ANESTHESIA PREPROCEDURE EVALUATION
Anesthesia Evaluation     Patient summary reviewed and Nursing notes reviewed   NPO Solid Status: > 8 hours  NPO Liquid Status: > 8 hours           Airway   Mallampati: II  TM distance: >3 FB  Neck ROM: full  No difficulty expected  Dental - normal exam     Pulmonary - normal exam    breath sounds clear to auscultation  (+) a smoker Current, Abstained day of surgery, cigarettes,  Cardiovascular - negative cardio ROS and normal exam  Exercise tolerance: unable to assess    ECG reviewed  Rhythm: regular  Rate: normal        Neuro/Psych  (+) numbness  GI/Hepatic/Renal/Endo - negative ROS     Musculoskeletal     Abdominal  - normal exam   Substance History - negative use     OB/GYN negative ob/gyn ROS         Other   arthritis,                 Anesthesia Plan    ASA 2     general     (? Screen)  intravenous induction     Anesthetic plan, risks, benefits, and alternatives have been provided, discussed and informed consent has been obtained with: patient.    CODE STATUS:

## 2024-08-27 LAB
LAB AP CASE REPORT: NORMAL
PATH REPORT.FINAL DX SPEC: NORMAL
PATH REPORT.GROSS SPEC: NORMAL

## 2024-09-10 ENCOUNTER — OFFICE VISIT (OUTPATIENT)
Dept: FAMILY MEDICINE CLINIC | Facility: CLINIC | Age: 64
End: 2024-09-10
Payer: COMMERCIAL

## 2024-09-10 ENCOUNTER — PATIENT ROUNDING (BHMG ONLY) (OUTPATIENT)
Dept: FAMILY MEDICINE CLINIC | Facility: CLINIC | Age: 64
End: 2024-09-10
Payer: COMMERCIAL

## 2024-09-10 VITALS
RESPIRATION RATE: 18 BRPM | OXYGEN SATURATION: 99 % | DIASTOLIC BLOOD PRESSURE: 85 MMHG | HEART RATE: 57 BPM | TEMPERATURE: 98.6 F | HEIGHT: 62 IN | BODY MASS INDEX: 26.72 KG/M2 | WEIGHT: 145.2 LBS | SYSTOLIC BLOOD PRESSURE: 134 MMHG

## 2024-09-10 DIAGNOSIS — R14.0 ABDOMINAL BLOATING: Primary | ICD-10-CM

## 2024-09-10 DIAGNOSIS — Z00.00 PREVENTATIVE HEALTH CARE: ICD-10-CM

## 2024-09-10 DIAGNOSIS — K59.09 CHRONIC CONSTIPATION: ICD-10-CM

## 2024-09-10 DIAGNOSIS — Z13.0 SCREENING FOR IRON DEFICIENCY ANEMIA: ICD-10-CM

## 2024-09-10 DIAGNOSIS — R14.0 ABDOMINAL DISTENTION: ICD-10-CM

## 2024-09-10 DIAGNOSIS — Z78.0 POSTMENOPAUSAL: ICD-10-CM

## 2024-09-10 DIAGNOSIS — E55.9 VITAMIN D DEFICIENCY: ICD-10-CM

## 2024-09-10 DIAGNOSIS — Z98.890 HISTORY OF ABDOMINAL SURGERY: ICD-10-CM

## 2024-09-10 DIAGNOSIS — Z76.89 ENCOUNTER TO ESTABLISH CARE: ICD-10-CM

## 2024-09-10 PROCEDURE — 99386 PREV VISIT NEW AGE 40-64: CPT | Performed by: NURSE PRACTITIONER

## 2024-09-10 NOTE — PROGRESS NOTES
09/10/24       My name is September and I am  with [facility].  I am writing to officially welcome you to our practice and ask about your recent visit.  Tell me about your visit with us. What things went well?  We're always looking for ways to make our patients' experiences even better. Do you have recommendations on ways we may improve?   Overall were you satisfied with your first visit to our practice?   Thank you for taking the time to answer a few questions today.     Thank you, and have a great day.  September

## 2024-09-10 NOTE — PROGRESS NOTES
Chief Complaint  Chief Complaint   Patient presents with    Establish Care     Previous provider Dr. Almanzar    Annual Exam     Last pap smear unknown. Hx of partial hysterectomy    Abdominal Pain     Onset x 1 yr. Bloating and feels tight in lower abdomen           Subjective          Natividad Messer presents to [facility] for   History of Present Illness      New 64-year-old female patient presents for annual exam and to establish care with new provider.  Last Pap negative, done at OB/GYN ~  Mammogram normal 10/19/2023  DEXA scan-none on file  Colonoscopy 2024, tubular and hyperplastic polyps removed, recall 3 years    Menopausal symptoms, she is using Climara patch weekly    Abdominal pain associated with bloating, she had a high tension tummy tuck in , the groin and abd is tight and uncomfortable. Had liposuction 1 year later when s/s started. She saw gastro and had colonoscopy 24, no CT scan. She reports the belly is hard as a rock every morning, abd feels heavy and tight all the time. She moves her bowels daily with miralax daily. Denies spasticity. She does report swelling in her feet/ankles more than usual lately. There are no specific foods that cause the discomfort, it is present all day.       The following portions of the patient's history were reviewed and updated as appropriate: allergies, current medications, past family history, past medical history, past social history, past surgical history and problem list.    Past Medical History:   Diagnosis Date    Degenerative disc disease, lumbar     External hemorrhoid      Past Surgical History:   Procedure Laterality Date    ABDOMINOPLASTY      second in     BREAST SURGERY       SECTION      COLONOSCOPY N/A 2024    Procedure: COLONOSCOPY with POLYPECTOMY X5 (COLD SNARE);  Surgeon: TETO Mcdonald MD;  Location: [facility] ENDOSCOPY;  Service: Gastroenterology;  Laterality: N/A;   "post:COLON POLYPS, DIVERTICULOSIS, HEMORRHOIDS    HYSTERECTOMY      partial    TUBAL ABDOMINAL LIGATION  1987     Family History   Problem Relation Age of Onset    Heart attack Mother     COPD Mother     Heart disease Mother     Heart disease Father     COPD Father     Early death Father      Social History     Tobacco Use    Smoking status: Former     Current packs/day: 1.50     Average packs/day: 1.5 packs/day for 33.7 years (50.6 ttl pk-yrs)     Types: Cigarettes     Start date: 1991    Smokeless tobacco: Never   Substance Use Topics    Alcohol use: Yes     Alcohol/week: 2.0 standard drinks of alcohol     Types: 2 Glasses of wine per week     Comment: social, wine or mixed drink       Current Outpatient Medications:     estradiol-levonorgestrel (Climara Pro) 0.045-0.015 MG/DAY, Apply 1 (one) Patch Weekly on skin once a week. Apply to clean, dry area on lower abdomen (avoid breasts, waistline). Rotate application sites., Disp: 4 patch, Rfl: 12    polyethylene glycol (MiraLax) 17 GM/SCOOP powder, Take as directed per instructions for bowel prep, Disp: 238 g, Rfl: 0    Prenatal Vit-Fe Fumarate-FA (PRENATAL VITAMIN PO), Take 1 tablet by mouth Daily., Disp: , Rfl:     VITAMIN D-VITAMIN K PO, Take 5,000 Units by mouth Daily., Disp: , Rfl:     B Complex Vitamins (B COMPLEX 100 PO), Take 1 capsule by mouth Daily., Disp: , Rfl:     Objective   Vital Signs:   /85 (BP Location: Left arm, Patient Position: Sitting, Cuff Size: Adult)   Pulse 57   Temp 98.6 °F (37 °C) (Oral)   Resp 18   Ht 157.5 cm (62\")   Wt 65.9 kg (145 lb 3.2 oz)   SpO2 99% Comment: Room air  BMI 26.56 kg/m²           Physical Exam  Constitutional:       General: She is not in acute distress.     Appearance: Normal appearance. She is well-developed. She is not ill-appearing or diaphoretic.   HENT:      Head: Normocephalic.   Eyes:      Conjunctiva/sclera: Conjunctivae normal.      Pupils: Pupils are equal, round, and reactive to light.   Neck: "      Thyroid: No thyromegaly.      Vascular: No JVD.   Cardiovascular:      Rate and Rhythm: Normal rate and regular rhythm.      Heart sounds: Normal heart sounds. No murmur heard.  Pulmonary:      Effort: Pulmonary effort is normal. No respiratory distress.      Breath sounds: Normal breath sounds. No wheezing or rhonchi.   Abdominal:      General: Bowel sounds are normal. There is distension.      Palpations: Abdomen is soft. There is no mass.      Tenderness: There is abdominal tenderness. There is no guarding or rebound.      Hernia: No hernia is present.      Comments: Firm and tender lower abdomen   Musculoskeletal:         General: No swelling or tenderness. Normal range of motion.      Cervical back: Normal range of motion and neck supple. No tenderness.   Lymphadenopathy:      Cervical: No cervical adenopathy.   Skin:     General: Skin is warm and dry.      Coloration: Skin is not jaundiced.      Findings: No erythema or rash.   Neurological:      General: No focal deficit present.      Mental Status: She is alert and oriented to person, place, and time. Mental status is at baseline.      Sensory: No sensory deficit.      Motor: No weakness.      Gait: Gait normal.   Psychiatric:         Mood and Affect: Mood normal.         Behavior: Behavior normal.         Thought Content: Thought content normal.         Judgment: Judgment normal.          Result Review :     Office Visit on 09/10/2024   Component Date Value Ref Range Status    Iron 09/12/2024 82  37 - 145 mcg/dL Final    Iron Saturation (TSAT) 09/12/2024 28  20 - 50 % Final    Transferrin 09/12/2024 200  200 - 360 mg/dL Final    TIBC 09/12/2024 298  298 - 536 mcg/dL Final                  BMI is >= 25 and <30. (Overweight) The following options were offered after discussion;: exercise counseling/recommendations and nutrition counseling/recommendations           Assessment and Plan    Diagnoses and all orders for this visit:    1. Abdominal bloating  (Primary)  -     CT Abdomen Pelvis Without Contrast; Future  -     Ambulatory Referral to Physical Therapy for Evaluation & Treatment  -     Comprehensive Metabolic Panel; Future    2. History of abdominal surgery  -     CT Abdomen Pelvis Without Contrast; Future  -     Ambulatory Referral to Physical Therapy for Evaluation & Treatment  -     Comprehensive Metabolic Panel; Future    3. Postmenopausal  -     CT Abdomen Pelvis Without Contrast; Future  -     Ambulatory Referral to Physical Therapy for Evaluation & Treatment    4. Abdominal distention  -     CT Abdomen Pelvis Without Contrast; Future  -     Ambulatory Referral to Physical Therapy for Evaluation & Treatment  -     Comprehensive Metabolic Panel; Future    5. Chronic constipation  Comments:  stable on miralax daily    6. Encounter to establish care    7. Preventative health care  -     Lipid Panel; Future  -     Comprehensive Metabolic Panel; Future  -     CBC (No Diff); Future  -     TSH; Future  -     Vitamin D,25-Hydroxy; Future    8. Vitamin D deficiency  -     Vitamin D,25-Hydroxy; Future    9. Screening for iron deficiency anemia  -     Iron Profile      Labs as ordered  Check CT abd/pelvis r/o adhesions, scarring etc.   Referral to PT for pelvic floor and lymph release eval/tx  Age appropriate preventative counseling provided, including healthy lifestyle modifications and exercise      I spent 30 minutes caring for Natividad Messer on this date of service. This time includes time spent by me in the following activities: preparing for the visit, reviewing tests, performing a medically appropriate examination and/or evaluation , counseling and educating the patient/family/caregiver, ordering medications, tests, or procedures and documenting information in the medical record        Follow Up     Return in about 1 year (around 9/10/2025) for Annual physical.  Patient was given instructions and counseling regarding her condition or for health maintenance  advice. Please see specific information pulled into the AVS if appropriate.        Part of this note may be an electronic transcription/translation of spoken language to printed text using the Dragon Dictation System

## 2024-09-12 ENCOUNTER — LAB (OUTPATIENT)
Dept: LAB | Facility: HOSPITAL | Age: 64
End: 2024-09-12
Payer: COMMERCIAL

## 2024-09-12 DIAGNOSIS — E55.9 VITAMIN D DEFICIENCY: ICD-10-CM

## 2024-09-12 DIAGNOSIS — R14.0 ABDOMINAL DISTENTION: ICD-10-CM

## 2024-09-12 DIAGNOSIS — Z98.890 HISTORY OF ABDOMINAL SURGERY: ICD-10-CM

## 2024-09-12 DIAGNOSIS — Z00.00 PREVENTATIVE HEALTH CARE: ICD-10-CM

## 2024-09-12 DIAGNOSIS — R14.0 ABDOMINAL BLOATING: ICD-10-CM

## 2024-09-12 LAB
25(OH)D3 SERPL-MCNC: 61 NG/ML (ref 30–100)
ALBUMIN SERPL-MCNC: 4.2 G/DL (ref 3.5–5.2)
ALBUMIN/GLOB SERPL: 1.8 G/DL
ALP SERPL-CCNC: 36 U/L (ref 39–117)
ALT SERPL W P-5'-P-CCNC: 12 U/L (ref 1–33)
ANION GAP SERPL CALCULATED.3IONS-SCNC: 9 MMOL/L (ref 5–15)
AST SERPL-CCNC: 16 U/L (ref 1–32)
BILIRUB SERPL-MCNC: 0.5 MG/DL (ref 0–1.2)
BUN SERPL-MCNC: 17 MG/DL (ref 8–23)
BUN/CREAT SERPL: 23.3 (ref 7–25)
CALCIUM SPEC-SCNC: 9.1 MG/DL (ref 8.6–10.5)
CHLORIDE SERPL-SCNC: 104 MMOL/L (ref 98–107)
CHOLEST SERPL-MCNC: 192 MG/DL (ref 0–200)
CO2 SERPL-SCNC: 25 MMOL/L (ref 22–29)
CREAT SERPL-MCNC: 0.73 MG/DL (ref 0.57–1)
DEPRECATED RDW RBC AUTO: 41.6 FL (ref 37–54)
EGFRCR SERPLBLD CKD-EPI 2021: 92.5 ML/MIN/1.73
ERYTHROCYTE [DISTWIDTH] IN BLOOD BY AUTOMATED COUNT: 12.2 % (ref 12.3–15.4)
GLOBULIN UR ELPH-MCNC: 2.3 GM/DL
GLUCOSE SERPL-MCNC: 85 MG/DL (ref 65–99)
HCT VFR BLD AUTO: 40.2 % (ref 34–46.6)
HDLC SERPL-MCNC: 95 MG/DL (ref 40–60)
HGB BLD-MCNC: 13.5 G/DL (ref 12–15.9)
IRON 24H UR-MRATE: 82 MCG/DL (ref 37–145)
IRON SATN MFR SERPL: 28 % (ref 20–50)
LDLC SERPL CALC-MCNC: 89 MG/DL (ref 0–100)
LDLC/HDLC SERPL: 0.93 {RATIO}
MCH RBC QN AUTO: 31.7 PG (ref 26.6–33)
MCHC RBC AUTO-ENTMCNC: 33.6 G/DL (ref 31.5–35.7)
MCV RBC AUTO: 94.4 FL (ref 79–97)
PLATELET # BLD AUTO: 234 10*3/MM3 (ref 140–450)
PMV BLD AUTO: 10.7 FL (ref 6–12)
POTASSIUM SERPL-SCNC: 3.9 MMOL/L (ref 3.5–5.2)
PROT SERPL-MCNC: 6.5 G/DL (ref 6–8.5)
RBC # BLD AUTO: 4.26 10*6/MM3 (ref 3.77–5.28)
SODIUM SERPL-SCNC: 138 MMOL/L (ref 136–145)
TIBC SERPL-MCNC: 298 MCG/DL (ref 298–536)
TRANSFERRIN SERPL-MCNC: 200 MG/DL (ref 200–360)
TRIGL SERPL-MCNC: 42 MG/DL (ref 0–150)
TSH SERPL DL<=0.05 MIU/L-ACNC: 1.45 UIU/ML (ref 0.27–4.2)
VLDLC SERPL-MCNC: 8 MG/DL (ref 5–40)
WBC NRBC COR # BLD AUTO: 5.46 10*3/MM3 (ref 3.4–10.8)

## 2024-09-12 PROCEDURE — 80061 LIPID PANEL: CPT

## 2024-09-12 PROCEDURE — 82306 VITAMIN D 25 HYDROXY: CPT

## 2024-09-12 PROCEDURE — 84466 ASSAY OF TRANSFERRIN: CPT | Performed by: NURSE PRACTITIONER

## 2024-09-12 PROCEDURE — 36415 COLL VENOUS BLD VENIPUNCTURE: CPT

## 2024-09-12 PROCEDURE — 80050 GENERAL HEALTH PANEL: CPT

## 2024-09-12 PROCEDURE — 83540 ASSAY OF IRON: CPT | Performed by: NURSE PRACTITIONER

## 2024-10-02 ENCOUNTER — HOSPITAL ENCOUNTER (OUTPATIENT)
Dept: CT IMAGING | Facility: HOSPITAL | Age: 64
Discharge: HOME | End: 2024-10-02
Admitting: NURSE PRACTITIONER
Payer: COMMERCIAL

## 2024-10-02 DIAGNOSIS — Z78.0 POSTMENOPAUSAL: ICD-10-CM

## 2024-10-02 DIAGNOSIS — R14.0 ABDOMINAL BLOATING: ICD-10-CM

## 2024-10-02 DIAGNOSIS — R14.0 ABDOMINAL DISTENTION: ICD-10-CM

## 2024-10-02 DIAGNOSIS — Z98.890 HISTORY OF ABDOMINAL SURGERY: ICD-10-CM

## 2024-10-02 PROCEDURE — 74176 CT ABD & PELVIS W/O CONTRAST: CPT

## 2024-10-03 PROBLEM — K80.20 CHOLELITHIASIS: Status: ACTIVE | Noted: 2024-10-03

## 2024-10-21 ENCOUNTER — OFFICE (OUTPATIENT)
Age: 64
End: 2024-10-21
Payer: COMMERCIAL

## 2024-10-21 ENCOUNTER — OFFICE (OUTPATIENT)
Dept: URBAN - METROPOLITAN AREA CLINIC 64 | Facility: CLINIC | Age: 64
End: 2024-10-21
Payer: COMMERCIAL

## 2024-10-21 VITALS — HEIGHT: 62 IN | HEIGHT: 62 IN | HEIGHT: 62 IN | HEIGHT: 62 IN | HEIGHT: 62 IN | HEIGHT: 62 IN | HEIGHT: 62 IN

## 2024-10-21 DIAGNOSIS — K64.1 SECOND DEGREE HEMORRHOIDS: ICD-10-CM

## 2024-10-21 PROCEDURE — 46221 LIGATION OF HEMORRHOID(S): CPT | Performed by: INTERNAL MEDICINE

## 2024-12-18 ENCOUNTER — HOSPITAL ENCOUNTER (EMERGENCY)
Facility: HOSPITAL | Age: 64
Discharge: HOME | End: 2024-12-18
Attending: EMERGENCY MEDICINE | Admitting: EMERGENCY MEDICINE
Payer: COMMERCIAL

## 2024-12-18 VITALS
HEIGHT: 62 IN | WEIGHT: 142.2 LBS | SYSTOLIC BLOOD PRESSURE: 125 MMHG | BODY MASS INDEX: 26.17 KG/M2 | DIASTOLIC BLOOD PRESSURE: 78 MMHG | HEART RATE: 82 BPM | RESPIRATION RATE: 20 BRPM | OXYGEN SATURATION: 99 % | TEMPERATURE: 97.7 F

## 2024-12-18 DIAGNOSIS — R11.0 NAUSEA: ICD-10-CM

## 2024-12-18 DIAGNOSIS — R42 VERTIGO: Primary | ICD-10-CM

## 2024-12-18 LAB
ANION GAP SERPL CALCULATED.3IONS-SCNC: 8.2 MMOL/L (ref 5–15)
BASOPHILS # BLD AUTO: 0.03 10*3/MM3 (ref 0–0.2)
BASOPHILS NFR BLD AUTO: 0.4 % (ref 0–1.5)
BUN SERPL-MCNC: 13 MG/DL (ref 8–23)
BUN/CREAT SERPL: 18.3 (ref 7–25)
CALCIUM SPEC-SCNC: 8.9 MG/DL (ref 8.6–10.5)
CHLORIDE SERPL-SCNC: 108 MMOL/L (ref 98–107)
CO2 SERPL-SCNC: 23.8 MMOL/L (ref 22–29)
CREAT SERPL-MCNC: 0.71 MG/DL (ref 0.57–1)
DEPRECATED RDW RBC AUTO: 43.2 FL (ref 37–54)
EGFRCR SERPLBLD CKD-EPI 2021: 95.7 ML/MIN/1.73
EOSINOPHIL # BLD AUTO: 0.03 10*3/MM3 (ref 0–0.4)
EOSINOPHIL NFR BLD AUTO: 0.4 % (ref 0.3–6.2)
ERYTHROCYTE [DISTWIDTH] IN BLOOD BY AUTOMATED COUNT: 12.7 % (ref 12.3–15.4)
GLUCOSE SERPL-MCNC: 107 MG/DL (ref 65–99)
HCT VFR BLD AUTO: 35.4 % (ref 34–46.6)
HGB BLD-MCNC: 12 G/DL (ref 12–15.9)
IMM GRANULOCYTES # BLD AUTO: 0.03 10*3/MM3 (ref 0–0.05)
IMM GRANULOCYTES NFR BLD AUTO: 0.4 % (ref 0–0.5)
LYMPHOCYTES # BLD AUTO: 0.53 10*3/MM3 (ref 0.7–3.1)
LYMPHOCYTES NFR BLD AUTO: 6.6 % (ref 19.6–45.3)
MCH RBC QN AUTO: 31.5 PG (ref 26.6–33)
MCHC RBC AUTO-ENTMCNC: 33.9 G/DL (ref 31.5–35.7)
MCV RBC AUTO: 92.9 FL (ref 79–97)
MONOCYTES # BLD AUTO: 0.25 10*3/MM3 (ref 0.1–0.9)
MONOCYTES NFR BLD AUTO: 3.1 % (ref 5–12)
NEUTROPHILS NFR BLD AUTO: 7.18 10*3/MM3 (ref 1.7–7)
NEUTROPHILS NFR BLD AUTO: 89.1 % (ref 42.7–76)
NRBC BLD AUTO-RTO: 0 /100 WBC (ref 0–0.2)
PLATELET # BLD AUTO: 192 10*3/MM3 (ref 140–450)
PMV BLD AUTO: 10.4 FL (ref 6–12)
POTASSIUM SERPL-SCNC: 3.7 MMOL/L (ref 3.5–5.2)
RBC # BLD AUTO: 3.81 10*6/MM3 (ref 3.77–5.28)
SODIUM SERPL-SCNC: 140 MMOL/L (ref 136–145)
WBC NRBC COR # BLD AUTO: 8.05 10*3/MM3 (ref 3.4–10.8)

## 2024-12-18 PROCEDURE — 25010000002 PROCHLORPERAZINE 10 MG/2ML SOLUTION: Performed by: EMERGENCY MEDICINE

## 2024-12-18 PROCEDURE — 93005 ELECTROCARDIOGRAM TRACING: CPT | Performed by: EMERGENCY MEDICINE

## 2024-12-18 PROCEDURE — 93005 ELECTROCARDIOGRAM TRACING: CPT

## 2024-12-18 PROCEDURE — 99283 EMERGENCY DEPT VISIT LOW MDM: CPT

## 2024-12-18 PROCEDURE — 85025 COMPLETE CBC W/AUTO DIFF WBC: CPT | Performed by: EMERGENCY MEDICINE

## 2024-12-18 PROCEDURE — 80048 BASIC METABOLIC PNL TOTAL CA: CPT | Performed by: EMERGENCY MEDICINE

## 2024-12-18 PROCEDURE — 96374 THER/PROPH/DIAG INJ IV PUSH: CPT

## 2024-12-18 RX ORDER — PROCHLORPERAZINE EDISYLATE 5 MG/ML
5 INJECTION INTRAMUSCULAR; INTRAVENOUS ONCE
Status: COMPLETED | OUTPATIENT
Start: 2024-12-18 | End: 2024-12-18

## 2024-12-18 RX ORDER — MECLIZINE HYDROCHLORIDE 25 MG/1
25 TABLET ORAL 3 TIMES DAILY PRN
Qty: 30 TABLET | Refills: 0 | Status: SHIPPED | OUTPATIENT
Start: 2024-12-18

## 2024-12-18 RX ORDER — ONDANSETRON 4 MG/1
4 TABLET, ORALLY DISINTEGRATING ORAL EVERY 8 HOURS PRN
Qty: 12 TABLET | Refills: 0 | Status: SHIPPED | OUTPATIENT
Start: 2024-12-18

## 2024-12-18 RX ORDER — SODIUM CHLORIDE 0.9 % (FLUSH) 0.9 %
10 SYRINGE (ML) INJECTION AS NEEDED
Status: DISCONTINUED | OUTPATIENT
Start: 2024-12-18 | End: 2024-12-18 | Stop reason: HOSPADM

## 2024-12-18 RX ADMIN — PROCHLORPERAZINE EDISYLATE 5 MG: 5 INJECTION INTRAMUSCULAR; INTRAVENOUS at 08:04

## 2024-12-18 NOTE — ED PROVIDER NOTES
Subjective   History of Present Illness  Patient is a 64-year-old female complaining of intermittent dizziness since yesterday.  She states the dizziness made the room spinning and she was nauseated with that.  She states she has mild improved this morning.  She has had this similar episodes in the past.  She denies headache chest pain shortness of breath or other complaints      Review of Systems    Past Medical History:   Diagnosis Date    Cholelithiasis 10/03/2024    Per CT 10/2024      Degenerative disc disease, lumbar     External hemorrhoid        Allergies   Allergen Reactions    Morphine Other (See Comments)     Teeth chattering and body tremors       Past Surgical History:   Procedure Laterality Date    ABDOMINOPLASTY      second in     BREAST SURGERY       SECTION      COLONOSCOPY N/A 2024    Procedure: COLONOSCOPY with POLYPECTOMY X5 (COLD SNARE);  Surgeon: TETO Mcdonald MD;  Location: [facility] ENDOSCOPY;  Service: Gastroenterology;  Laterality: N/A;  post:COLON POLYPS, DIVERTICULOSIS, HEMORRHOIDS    HYSTERECTOMY      partial    TUBAL ABDOMINAL LIGATION         Family History   Problem Relation Age of Onset    Heart attack Mother     COPD Mother     Heart disease Mother     Heart disease Father     COPD Father     Early death Father        Social History     Socioeconomic History    Marital status:    Tobacco Use    Smoking status: Former     Current packs/day: 1.50     Average packs/day: 1.5 packs/day for 34.0 years (50.9 ttl pk-yrs)     Types: Cigarettes     Start date:     Smokeless tobacco: Never   Vaping Use    Vaping status: Never Used   Substance and Sexual Activity    Alcohol use: Yes     Alcohol/week: 2.0 standard drinks of alcohol     Types: 2 Glasses of wine per week     Comment: social, wine or mixed drink    Drug use: No    Sexual activity: Defer           Objective   Physical Exam  Neurologic exam is nonfocal.  Neck has no adenopathy JVD or bruits.   Lungs are clear.  Heart has a regular rate rhythm without murmur rub or gallop.  Chest is nontender.  Abdomen soft.  Extremities I am unremarkable.  Procedures     My EKG interpretation shows normal sinus rhythm at a rate of 53 with no acute ST change      ED Course      Results for orders placed or performed during the hospital encounter of 12/18/24   ECG 12 Lead Other; dizziness.    Collection Time: 12/18/24  7:33 AM   Result Value Ref Range    QT Interval 433 ms    QTC Interval 405 ms   Basic Metabolic Panel    Collection Time: 12/18/24  8:05 AM    Specimen: Blood   Result Value Ref Range    Glucose 107 (H) 65 - 99 mg/dL    BUN 13 8 - 23 mg/dL    Creatinine 0.71 0.57 - 1.00 mg/dL    Sodium 140 136 - 145 mmol/L    Potassium 3.7 3.5 - 5.2 mmol/L    Chloride 108 (H) 98 - 107 mmol/L    CO2 23.8 22.0 - 29.0 mmol/L    Calcium 8.9 8.6 - 10.5 mg/dL    BUN/Creatinine Ratio 18.3 7.0 - 25.0    Anion Gap 8.2 5.0 - 15.0 mmol/L    eGFR 95.7 >60.0 mL/min/1.73   CBC Auto Differential    Collection Time: 12/18/24  8:05 AM    Specimen: Blood   Result Value Ref Range    WBC 8.05 3.40 - 10.80 10*3/mm3    RBC 3.81 3.77 - 5.28 10*6/mm3    Hemoglobin 12.0 12.0 - 15.9 g/dL    Hematocrit 35.4 34.0 - 46.6 %    MCV 92.9 79.0 - 97.0 fL    MCH 31.5 26.6 - 33.0 pg    MCHC 33.9 31.5 - 35.7 g/dL    RDW 12.7 12.3 - 15.4 %    RDW-SD 43.2 37.0 - 54.0 fl    MPV 10.4 6.0 - 12.0 fL    Platelets 192 140 - 450 10*3/mm3    Neutrophil % 89.1 (H) 42.7 - 76.0 %    Lymphocyte % 6.6 (L) 19.6 - 45.3 %    Monocyte % 3.1 (L) 5.0 - 12.0 %    Eosinophil % 0.4 0.3 - 6.2 %    Basophil % 0.4 0.0 - 1.5 %    Immature Grans % 0.4 0.0 - 0.5 %    Neutrophils, Absolute 7.18 (H) 1.70 - 7.00 10*3/mm3    Lymphocytes, Absolute 0.53 (L) 0.70 - 3.10 10*3/mm3    Monocytes, Absolute 0.25 0.10 - 0.90 10*3/mm3    Eosinophils, Absolute 0.03 0.00 - 0.40 10*3/mm3    Basophils, Absolute 0.03 0.00 - 0.20 10*3/mm3    Immature Grans, Absolute 0.03 0.00 - 0.05 10*3/mm3    nRBC 0.0 0.0 -  0.2 /100 WBC                                                      Medical Decision Making  CBC shows no leukocytosis no left shift and no anemia.  Metabolic panel shows no renal insufficiency or electrolyte abnormality.  Patient was given IV Compazine.  On reexam she is at baseline.  She will be discharged with Antivert and Zofran.  She will see her MD for recheck.    Amount and/or Complexity of Data Reviewed  Labs: ordered. Decision-making details documented in ED Course.  ECG/medicine tests: ordered and independent interpretation performed.    Risk  Prescription drug management.        Final diagnoses:   Vertigo   Nausea       ED Disposition  ED Disposition       ED Disposition   Discharge    Condition   Stable    Comment   --               No follow-up provider specified.       Medication List        New Prescriptions      meclizine 25 MG tablet  Commonly known as: ANTIVERT  Take 1 tablet by mouth 3 (Three) Times a Day As Needed for Dizziness.     ondansetron ODT 4 MG disintegrating tablet  Commonly known as: ZOFRAN-ODT  Place 1 tablet on the tongue Every 8 (Eight) Hours As Needed for Vomiting.               Where to Get Your Medications        Information about where to get these medications is not yet available    Ask your nurse or doctor about these medications  meclizine 25 MG tablet  ondansetron ODT 4 MG disintegrating tablet            Manolo Storm MD  12/18/24 0943

## 2024-12-18 NOTE — ED NOTES
Pt sts when she woke up this morning she had dizziness and nausea.  Pt sts it is worse when she turns her head. Pt sts

## 2024-12-18 NOTE — Clinical Note
[facility] EMERGENCY DEPARTMENT  [address] [place] [address]  Phone: [phone]    Natividad Messer was seen and treated in our emergency department on 12/18/2024.  She may return to work on 12/19/2024.         Thank you for choosing [facility].    Manolo Storm MD

## 2024-12-19 LAB
QT INTERVAL: 433 MS
QTC INTERVAL: 405 MS

## 2024-12-23 ENCOUNTER — LAB (OUTPATIENT)
Dept: LAB | Facility: HOSPITAL | Age: 64
End: 2024-12-23
Payer: COMMERCIAL

## 2024-12-23 ENCOUNTER — TRANSCRIBE ORDERS (OUTPATIENT)
Dept: LAB | Facility: HOSPITAL | Age: 64
End: 2024-12-23
Payer: COMMERCIAL

## 2024-12-23 DIAGNOSIS — R68.82 LOW LIBIDO: ICD-10-CM

## 2024-12-23 DIAGNOSIS — E55.9 VITAMIN D DEFICIENCY: ICD-10-CM

## 2024-12-23 DIAGNOSIS — E34.50 ANDROGEN RESISTANCE SYNDROME: ICD-10-CM

## 2024-12-23 DIAGNOSIS — E63.9 NUTRITIONAL DEFICIENCY: ICD-10-CM

## 2024-12-23 DIAGNOSIS — R45.86 MOOD SWING: ICD-10-CM

## 2024-12-23 DIAGNOSIS — R14.0 ABDOMINAL BLOATING: Primary | ICD-10-CM

## 2024-12-23 DIAGNOSIS — Z82.49 FAMILY HISTORY OF ISCHEMIC HEART DISEASE: ICD-10-CM

## 2024-12-23 DIAGNOSIS — R19.8 GI SYMPTOMS: ICD-10-CM

## 2024-12-23 DIAGNOSIS — Z13.6 SCREENING FOR ISCHEMIC HEART DISEASE: ICD-10-CM

## 2024-12-23 DIAGNOSIS — R14.0 ABDOMINAL BLOATING: ICD-10-CM

## 2024-12-23 DIAGNOSIS — E56.9 MULTIPLE VITAMIN DEFICIENCY DISEASE: ICD-10-CM

## 2024-12-23 DIAGNOSIS — R37 SEXUAL DYSFUNCTION: ICD-10-CM

## 2024-12-23 DIAGNOSIS — G47.9 DISTURBANCE, SLEEP: ICD-10-CM

## 2024-12-23 DIAGNOSIS — N95.1 MENOPAUSAL STATE: ICD-10-CM

## 2024-12-23 DIAGNOSIS — E34.9 ENDOCRINE DISORDER RELATED TO PUBERTY: ICD-10-CM

## 2024-12-23 DIAGNOSIS — D64.9 ANEMIA, UNSPECIFIED TYPE: ICD-10-CM

## 2024-12-23 DIAGNOSIS — R87.1 ABNORMAL HORMONE LEVEL IN SPECIMEN FROM FEMALE GENITAL ORGAN: ICD-10-CM

## 2024-12-23 DIAGNOSIS — Z13.220 SCREENING FOR LIPOID DISORDERS: ICD-10-CM

## 2024-12-23 DIAGNOSIS — R53.83 TIREDNESS: ICD-10-CM

## 2024-12-23 DIAGNOSIS — K59.9 FUNCTIONAL DISORDER OF INTESTINE: ICD-10-CM

## 2024-12-23 DIAGNOSIS — Z79.890 NEED FOR PROPHYLACTIC HORMONE REPLACEMENT THERAPY (POSTMENOPAUSAL): ICD-10-CM

## 2024-12-23 DIAGNOSIS — E01.0 IODINE-DEFICIENCY RELATED DIFFUSE GOITER: ICD-10-CM

## 2024-12-23 DIAGNOSIS — E66.3 SEVERELY OVERWEIGHT: ICD-10-CM

## 2024-12-23 DIAGNOSIS — N76.0 OTHER VAGINITIS: ICD-10-CM

## 2024-12-23 DIAGNOSIS — E61.6 VANADIUM DEFICIENCY: ICD-10-CM

## 2024-12-23 LAB
25(OH)D3 SERPL-MCNC: 46.5 NG/ML (ref 30–100)
ALBUMIN SERPL-MCNC: 3.9 G/DL (ref 3.5–5.2)
ALBUMIN/GLOB SERPL: 1.6 G/DL
ALP SERPL-CCNC: 35 U/L (ref 39–117)
ALT SERPL W P-5'-P-CCNC: 13 U/L (ref 1–33)
ANION GAP SERPL CALCULATED.3IONS-SCNC: 16 MMOL/L (ref 5–15)
AST SERPL-CCNC: 19 U/L (ref 1–32)
BASOPHILS # BLD AUTO: 0.04 10*3/MM3 (ref 0–0.2)
BASOPHILS NFR BLD AUTO: 0.9 % (ref 0–1.5)
BILIRUB SERPL-MCNC: 0.7 MG/DL (ref 0–1.2)
BUN SERPL-MCNC: 13 MG/DL (ref 8–23)
BUN/CREAT SERPL: 17.1 (ref 7–25)
CALCIUM SPEC-SCNC: 9 MG/DL (ref 8.6–10.5)
CHLORIDE SERPL-SCNC: 99 MMOL/L (ref 98–107)
CO2 SERPL-SCNC: 25 MMOL/L (ref 22–29)
CREAT SERPL-MCNC: 0.76 MG/DL (ref 0.57–1)
CRP SERPL-MCNC: 0.18 MG/DL (ref 0.01–0.5)
DEPRECATED RDW RBC AUTO: 42.6 FL (ref 37–54)
EGFRCR SERPLBLD CKD-EPI 2021: 88.2 ML/MIN/1.73
EOSINOPHIL # BLD AUTO: 0.22 10*3/MM3 (ref 0–0.4)
EOSINOPHIL NFR BLD AUTO: 5.2 % (ref 0.3–6.2)
ERYTHROCYTE [DISTWIDTH] IN BLOOD BY AUTOMATED COUNT: 12.6 % (ref 12.3–15.4)
ERYTHROCYTE [SEDIMENTATION RATE] IN BLOOD: 3 MM/HR (ref 0–30)
ESTRADIOL SERPL HS-MCNC: 12.4 PG/ML
FERRITIN SERPL-MCNC: 160 NG/ML (ref 13–150)
FOLATE SERPL-MCNC: 16 NG/ML (ref 4.78–24.2)
GLOBULIN UR ELPH-MCNC: 2.5 GM/DL
GLUCOSE SERPL-MCNC: 82 MG/DL (ref 65–99)
HBA1C MFR BLD: 5.49 % (ref 4.8–5.6)
HCT VFR BLD AUTO: 37.7 % (ref 34–46.6)
HCYS SERPL-MCNC: 7.9 UMOL/L (ref 0–15)
HGB BLD-MCNC: 12.5 G/DL (ref 12–15.9)
IMM GRANULOCYTES # BLD AUTO: 0.01 10*3/MM3 (ref 0–0.05)
IMM GRANULOCYTES NFR BLD AUTO: 0.2 % (ref 0–0.5)
INSULIN SERPL-ACNC: 6.14 UU/ML (ref 2–23)
IRON 24H UR-MRATE: 76 MCG/DL (ref 37–145)
IRON SATN MFR SERPL: 28 % (ref 20–50)
LYMPHOCYTES # BLD AUTO: 1.13 10*3/MM3 (ref 0.7–3.1)
LYMPHOCYTES NFR BLD AUTO: 26.5 % (ref 19.6–45.3)
MAGNESIUM SERPL-MCNC: 2.1 MG/DL (ref 1.6–2.4)
MCH RBC QN AUTO: 30.4 PG (ref 26.6–33)
MCHC RBC AUTO-ENTMCNC: 33.2 G/DL (ref 31.5–35.7)
MCV RBC AUTO: 91.7 FL (ref 79–97)
MONOCYTES # BLD AUTO: 0.31 10*3/MM3 (ref 0.1–0.9)
MONOCYTES NFR BLD AUTO: 7.3 % (ref 5–12)
NEUTROPHILS NFR BLD AUTO: 2.56 10*3/MM3 (ref 1.7–7)
NEUTROPHILS NFR BLD AUTO: 59.9 % (ref 42.7–76)
NRBC BLD AUTO-RTO: 0 /100 WBC (ref 0–0.2)
PLATELET # BLD AUTO: 222 10*3/MM3 (ref 140–450)
PMV BLD AUTO: 10.1 FL (ref 6–12)
POTASSIUM SERPL-SCNC: 3.7 MMOL/L (ref 3.5–5.2)
PROGEST SERPL-MCNC: <0.05 NG/ML
PROLACTIN SERPL-MCNC: 8.05 NG/ML (ref 4.79–23.3)
PROT SERPL-MCNC: 6.4 G/DL (ref 6–8.5)
RBC # BLD AUTO: 4.11 10*6/MM3 (ref 3.77–5.28)
SODIUM SERPL-SCNC: 140 MMOL/L (ref 136–145)
T3FREE SERPL-MCNC: 2.94 PG/ML (ref 2–4.4)
TIBC SERPL-MCNC: 273 MCG/DL (ref 298–536)
TRANSFERRIN SERPL-MCNC: 183 MG/DL (ref 200–360)
TSH SERPL DL<=0.05 MIU/L-ACNC: 1.61 UIU/ML (ref 0.27–4.2)
VIT B12 BLD-MCNC: 484 PG/ML (ref 211–946)
WBC NRBC COR # BLD AUTO: 4.27 10*3/MM3 (ref 3.4–10.8)

## 2024-12-23 PROCEDURE — 82607 VITAMIN B-12: CPT

## 2024-12-23 PROCEDURE — 84466 ASSAY OF TRANSFERRIN: CPT

## 2024-12-23 PROCEDURE — 86376 MICROSOMAL ANTIBODY EACH: CPT

## 2024-12-23 PROCEDURE — 36415 COLL VENOUS BLD VENIPUNCTURE: CPT

## 2024-12-23 PROCEDURE — 82670 ASSAY OF TOTAL ESTRADIOL: CPT

## 2024-12-23 PROCEDURE — 83540 ASSAY OF IRON: CPT

## 2024-12-23 PROCEDURE — 82728 ASSAY OF FERRITIN: CPT

## 2024-12-23 PROCEDURE — 82306 VITAMIN D 25 HYDROXY: CPT

## 2024-12-23 PROCEDURE — 83090 ASSAY OF HOMOCYSTEINE: CPT

## 2024-12-23 PROCEDURE — 84432 ASSAY OF THYROGLOBULIN: CPT

## 2024-12-23 PROCEDURE — 80061 LIPID PANEL: CPT

## 2024-12-23 PROCEDURE — 83525 ASSAY OF INSULIN: CPT

## 2024-12-23 PROCEDURE — 81599 UNLISTED MAAA: CPT

## 2024-12-23 PROCEDURE — 84140 ASSAY OF PREGNENOLONE: CPT

## 2024-12-23 PROCEDURE — 80050 GENERAL HEALTH PANEL: CPT

## 2024-12-23 PROCEDURE — 84482 T3 REVERSE: CPT

## 2024-12-23 PROCEDURE — 83735 ASSAY OF MAGNESIUM: CPT

## 2024-12-23 PROCEDURE — 84144 ASSAY OF PROGESTERONE: CPT

## 2024-12-23 PROCEDURE — 82746 ASSAY OF FOLIC ACID SERUM: CPT

## 2024-12-23 PROCEDURE — 84402 ASSAY OF FREE TESTOSTERONE: CPT

## 2024-12-23 PROCEDURE — 86141 C-REACTIVE PROTEIN HS: CPT

## 2024-12-23 PROCEDURE — 84481 FREE ASSAY (FT-3): CPT

## 2024-12-23 PROCEDURE — 83789 MASS SPECTROMETRY QUAL/QUAN: CPT

## 2024-12-23 PROCEDURE — 82172 ASSAY OF APOLIPOPROTEIN: CPT

## 2024-12-23 PROCEDURE — 85652 RBC SED RATE AUTOMATED: CPT

## 2024-12-23 PROCEDURE — 0024U GLYCA NUC MR SPECTRSC QUAN: CPT

## 2024-12-23 PROCEDURE — 84146 ASSAY OF PROLACTIN: CPT

## 2024-12-23 PROCEDURE — 83036 HEMOGLOBIN GLYCOSYLATED A1C: CPT

## 2024-12-23 PROCEDURE — 84403 ASSAY OF TOTAL TESTOSTERONE: CPT

## 2024-12-23 PROCEDURE — 86800 THYROGLOBULIN ANTIBODY: CPT

## 2024-12-23 PROCEDURE — 82627 DEHYDROEPIANDROSTERONE: CPT

## 2024-12-24 LAB
DHEA-S SERPL-MCNC: 38.4 UG/DL (ref 29.4–220.5)
TESTOST FREE SERPL-MCNC: 0.3 PG/ML (ref 0–4.2)
TESTOST SERPL-MCNC: <3 NG/DL (ref 3–67)
THYROGLOB AB SERPL-ACNC: <1 IU/ML (ref 0–0.9)
THYROGLOB SERPL-MCNC: 12 NG/ML (ref 1.5–38.5)

## 2024-12-25 LAB — THYROPEROXIDASE AB SERPL-ACNC: 12 IU/ML (ref 0–34)

## 2024-12-26 LAB
APO B SERPL-MCNC: 71 MG/DL
CHOLEST SERPL-MCNC: 182 MG/DL (ref 100–199)
DIABETES RISK SCORE CALC: <14 (ref 0–39)
GLYCA SERPL-SCNC: 331 UMOL/L
HDLC SERPL-MCNC: 84 MG/DL
LDLC SERPL CALC-MCNC: 86 MG/DL (ref 0–99)
Lab: NORMAL
NONHDLC SERPL-MCNC: 98 MG/DL (ref 0–129)
TRIGL SERPL-MCNC: 66 MG/DL (ref 0–149)

## 2024-12-27 LAB — IODINE SERPL-MCNC: 40.4 UG/L (ref 40–92)

## 2024-12-31 LAB — PREG SERPL-MCNC: 17 NG/DL

## 2025-01-03 LAB — T3REVERSE SERPL-MCNC: 19.1 NG/DL (ref 9.2–24.1)

## 2025-01-24 ENCOUNTER — HOSPITAL ENCOUNTER (OUTPATIENT)
Dept: MAMMOGRAPHY | Facility: HOSPITAL | Age: 65
Discharge: HOME | End: 2025-01-24
Admitting: NURSE PRACTITIONER
Payer: COMMERCIAL

## 2025-01-24 ENCOUNTER — TRANSCRIBE ORDERS (OUTPATIENT)
Dept: ADMINISTRATIVE | Facility: HOSPITAL | Age: 65
End: 2025-01-24
Payer: COMMERCIAL

## 2025-01-24 DIAGNOSIS — Z12.31 VISIT FOR SCREENING MAMMOGRAM: Primary | ICD-10-CM

## 2025-01-24 DIAGNOSIS — Z12.31 VISIT FOR SCREENING MAMMOGRAM: ICD-10-CM

## 2025-01-24 PROCEDURE — 77067 SCR MAMMO BI INCL CAD: CPT

## 2025-01-24 PROCEDURE — 77063 BREAST TOMOSYNTHESIS BI: CPT

## 2025-02-26 ENCOUNTER — OFFICE (OUTPATIENT)
Dept: URBAN - METROPOLITAN AREA CLINIC 64 | Facility: CLINIC | Age: 65
End: 2025-02-26
Payer: COMMERCIAL

## 2025-02-26 VITALS — HEIGHT: 62 IN

## 2025-02-26 DIAGNOSIS — K64.1 SECOND DEGREE HEMORRHOIDS: ICD-10-CM

## 2025-02-26 PROCEDURE — 46221 LIGATION OF HEMORRHOID(S): CPT | Performed by: INTERNAL MEDICINE

## 2025-03-17 ENCOUNTER — OFFICE VISIT (OUTPATIENT)
Age: 65
End: 2025-03-17
Payer: COMMERCIAL

## 2025-03-17 ENCOUNTER — PATIENT ROUNDING (BHMG ONLY) (OUTPATIENT)
Age: 65
End: 2025-03-17
Payer: COMMERCIAL

## 2025-03-17 VITALS — HEART RATE: 60 BPM | HEIGHT: 62 IN | RESPIRATION RATE: 20 BRPM | BODY MASS INDEX: 26.13 KG/M2 | WEIGHT: 142 LBS

## 2025-03-17 DIAGNOSIS — M67.472 GANGLION CYST OF LEFT FOOT: ICD-10-CM

## 2025-03-17 DIAGNOSIS — M25.572 ACUTE LEFT ANKLE PAIN: Primary | ICD-10-CM

## 2025-03-17 PROCEDURE — 99203 OFFICE O/P NEW LOW 30 MIN: CPT | Performed by: PODIATRIST

## 2025-03-17 NOTE — PROGRESS NOTES
2025  Foot and Ankle Surgery - New Patient   Provider: Dr. Amando Marcelo DPM  Location: [facility]    Subjective:  Natividad Messer is a 64 y.o. female.     Chief Complaint   Patient presents with    Left Ankle - Initial Evaluation, Cyst     Knot on ankle since saturday    Initial Evaluation     PCP: Sara Powell APRN  Last PCP Visit: 2024         History of Present Illness  The patient presents for evaluation of a left ankle ganglion cyst.    She reports the sudden appearance of a mass on her left ankle over the past week, which she describes as painless but unusual in appearance. She has no history of previous issues with the same ankle, including any known instances of sprains. She speculates that the development of the cyst may be related to her wearing an unfamiliar pair of high-heeled shoes last weekend.       Allergies   Allergen Reactions    Morphine Other (See Comments)     Teeth chattering and body tremors       Past Medical History:   Diagnosis Date    Cholelithiasis 10/03/2024    Per CT 10/2024      Degenerative disc disease, lumbar     External hemorrhoid        Past Surgical History:   Procedure Laterality Date    ABDOMINOPLASTY      second in     BREAST SURGERY       SECTION      COLONOSCOPY N/A 2024    Procedure: COLONOSCOPY with POLYPECTOMY X5 (COLD SNARE);  Surgeon: TETO Mcdonald MD;  Location: [facility];  Service: Gastroenterology;  Laterality: N/A;  post:COLON POLYPS, DIVERTICULOSIS, HEMORRHOIDS    HYSTERECTOMY      partial    TUBAL ABDOMINAL LIGATION         Family History   Problem Relation Age of Onset    Heart attack Mother     COPD Mother     Heart disease Mother     Heart disease Father     COPD Father     Early death Father        Social History     Socioeconomic History    Marital status:    Tobacco Use    Smoking status: Former     Current packs/day: 1.50     Average packs/day: 1.5 packs/day for 34.2 years (51.3 ttl  "pk-yrs)     Types: Cigarettes     Start date: 1991     Passive exposure: Past    Smokeless tobacco: Never   Vaping Use    Vaping status: Never Used   Substance and Sexual Activity    Alcohol use: Yes     Alcohol/week: 2.0 standard drinks of alcohol     Types: 2 Glasses of wine per week     Comment: social, wine or mixed drink    Drug use: No    Sexual activity: Defer        Current Outpatient Medications on File Prior to Visit   Medication Sig Dispense Refill    B Complex Vitamins (B COMPLEX 100 PO) Take 1 capsule by mouth Daily.      estradiol-levonorgestrel (Climara Pro) 0.045-0.015 MG/DAY Apply 1 (one) Patch Weekly on skin once a week. Apply to clean, dry area on lower abdomen (avoid breasts, waistline). Rotate application sites. 4 patch 12    meclizine (ANTIVERT) 25 MG tablet Take 1 tablet by mouth 3 (Three) Times a Day As Needed for Dizziness. 30 tablet 0    ondansetron ODT (ZOFRAN-ODT) 4 MG disintegrating tablet Place 1 tablet on the tongue Every 8 (Eight) Hours As Needed for Vomiting. 12 tablet 0    polyethylene glycol (MiraLax) 17 GM/SCOOP powder Take as directed per instructions for bowel prep 238 g 0    Prenatal Vit-Fe Fumarate-FA (PRENATAL VITAMIN PO) Take 1 tablet by mouth Daily.      VITAMIN D-VITAMIN K PO Take 5,000 Units by mouth Daily.       No current facility-administered medications on file prior to visit.         Objective   Pulse 60   Resp 20   Ht 157.5 cm (62\")   Wt 64.4 kg (142 lb)   BMI 25.97 kg/m²     Foot/Ankle Exam    GENERAL  Appearance:  appears stated age  Orientation:  AAOx3  Affect:  appropriate    VASCULAR     Left Foot Vascularity   Normal vascular exam    Dorsalis pedis:  2+  Posterior tibial:  2+  Skin temperature:  warm  Edema grading:  None  CFT:  < 3 seconds  Pedal hair growth:  Present  Varicosities:  none     NEUROLOGIC     Left Foot Neurologic   Light touch sensation: normal  Hot/Cold sensation:  normal  Achilles reflex:  2+    MUSCULOSKELETAL     Left Foot " Musculoskeletal   Ecchymosis:  none  Tenderness:  none  Arch:  Normal    MUSCLE STRENGTH     Left Foot Muscle Strength   Normal strength    Foot dorsiflexion:  5  Foot plantar flexion:  5  Foot inversion:  5  Foot eversion:  5    RANGE OF MOTION     Left Foot Range of Motion   Foot and ankle ROM within normal limits      DERMATOLOGIC        Left Foot Dermatologic   Skin  Left foot skin is intact.     TESTS     Left Foot Tests   Anterior drawer: negative  Varus tilt: negative     Left foot additional comments: Soft tissue swelling to the anterior lateral aspect of the ankle just inferior to the lateral malleolus.  Firm, cystic lesion measuring approximately 1 cm in diameter.  No discomfort with palpation.  Range of motion muscle strength is appropriate.  No obvious deformity    Physical Exam         Results  Imaging  X-ray of the ankle shows no arthritis, fractures, or dislocations.       Assessment & Plan   Diagnoses and all orders for this visit:    1. Acute left ankle pain (Primary)  -     XR Ankle 3+ View Left    2. Ganglion cyst of left foot       Assessment & Plan    The patient was informed that a ganglion cyst is an outpouching of the joint or tendon, which can occur due to increased stress or previous injury. The cyst is not causing significant issues at this time. An x-ray was performed, showing no arthritis, fractures, or dislocations. The patient was advised to monitor the cyst as it may absorb and disappear on its own. Aspiration was discussed as an option but noted that the cyst could recur. Surgical removal was mentioned but deemed unnecessary at this time. The patient was advised to massage the area gently and consider using a compression sock or wrap, ensuring not to tighten it excessively. An MRI was offered if the patient had concerns, but the patient opted to wait and see how the condition progresses. A follow-up appointment was scheduled for 4 to 6 weeks to reassess the cyst. If the cyst becomes  problematic, aspiration or surgical removal will be considered.Reviewed proper basic stretching and manual therapy exercises along with appropriate shoes and activity.  Discussed proper use and/or avoidance of OTC anti-inflammatories.  Patient is to call with any additional issues or concerns.  Greater than 30 minutes was spent before, during, and after evaluation for patient care.    The encounter note is created with the use of AI technology.  I do apologize if there are typos and/or confusion within the note.  Please feel free to contact me or my office with any questions or concerns.    Follow-up  The patient will follow up in 4 to 6 weeks.           Patient or patient representative verbalized consent for the use of Ambient Listening during the visit with  KEEGAN Marcelo DPM for chart documentation. 3/17/2025  09:06 EDT    KEEGAN Marcelo DPM

## 2025-05-07 ENCOUNTER — OFFICE VISIT (OUTPATIENT)
Age: 65
End: 2025-05-07
Payer: COMMERCIAL

## 2025-05-07 VITALS — HEIGHT: 62 IN | OXYGEN SATURATION: 100 % | WEIGHT: 141.4 LBS | HEART RATE: 60 BPM | BODY MASS INDEX: 26.02 KG/M2

## 2025-05-07 DIAGNOSIS — G89.29 CHRONIC PAIN OF LEFT ANKLE: Primary | ICD-10-CM

## 2025-05-07 DIAGNOSIS — M25.572 CHRONIC PAIN OF LEFT ANKLE: Primary | ICD-10-CM

## 2025-05-07 DIAGNOSIS — M67.472 GANGLION CYST OF LEFT FOOT: ICD-10-CM

## 2025-05-08 NOTE — PROGRESS NOTES
05/07/2025  Foot and Ankle Surgery - Established Patient/Follow-up  Provider: Dr. Amando Marcelo DPM  Location: [facility]    Subjective:  Natividad Messer is a 65 y.o. female.     Chief Complaint   Patient presents with    Left Ankle - Cyst, Follow-up     Knot on ankle since saturday    Follow-Up     Sara Powell, APRN  9/10/24       History of Present Illness  The patient presents for evaluation of a mass on her ankle.    She reports a persistent mass on her ankle, which she has been attempting to alleviate through massage without success. The mass is not associated with any pain or discomfort but is causing cosmetic concern due to its appearance. She expresses interest in discussing potential treatment options, including drainage or surgical removal of the mass. Additionally, she seeks information regarding the recovery process post-surgery, specifically whether she would be able to ambulate and return to work promptly.      Allergies   Allergen Reactions    Morphine Other (See Comments)     Teeth chattering and body tremors       Current Outpatient Medications on File Prior to Visit   Medication Sig Dispense Refill    B Complex Vitamins (B COMPLEX 100 PO) Take 1 capsule by mouth Daily.      estradiol-levonorgestrel (Climara Pro) 0.045-0.015 MG/DAY Apply 1 (one) Patch Weekly on skin once a week. Apply to clean, dry area on lower abdomen (avoid breasts, waistline). Rotate application sites. 4 patch 12    meclizine (ANTIVERT) 25 MG tablet Take 1 tablet by mouth 3 (Three) Times a Day As Needed for Dizziness. 30 tablet 0    ondansetron ODT (ZOFRAN-ODT) 4 MG disintegrating tablet Place 1 tablet on the tongue Every 8 (Eight) Hours As Needed for Vomiting. 12 tablet 0    polyethylene glycol (MiraLax) 17 GM/SCOOP powder Take as directed per instructions for bowel prep 238 g 0    Prenatal Vit-Fe Fumarate-FA (PRENATAL VITAMIN PO) Take 1 tablet by mouth Daily.      VITAMIN D-VITAMIN K PO Take 5,000 Units by mouth  "Daily.       No current facility-administered medications on file prior to visit.       Objective   Pulse 60   Ht 157.5 cm (62\")   Wt 64.1 kg (141 lb 6.4 oz)   SpO2 100%   BMI 25.86 kg/m²     Foot/Ankle Exam  Physical Exam  GENERAL  Appearance:  appears stated age  Orientation:  AAOx3  Affect:  appropriate     VASCULAR      Left Foot Vascularity   Normal vascular exam    Dorsalis pedis:  2+  Posterior tibial:  2+  Skin temperature:  warm  Edema grading:  None  CFT:  < 3 seconds  Pedal hair growth:  Present  Varicosities:  none     NEUROLOGIC      Left Foot Neurologic   Light touch sensation: normal  Hot/Cold sensation:  normal  Achilles reflex:  2+     MUSCULOSKELETAL      Left Foot Musculoskeletal   Ecchymosis:  none  Tenderness:  none  Arch:  Normal     MUSCLE STRENGTH      Left Foot Muscle Strength   Normal strength    Foot dorsiflexion:  5  Foot plantar flexion:  5  Foot inversion:  5  Foot eversion:  5     RANGE OF MOTION      Left Foot Range of Motion   Foot and ankle ROM within normal limits       DERMATOLOGIC          Left Foot Dermatologic   Skin  Left foot skin is intact.      TESTS      Left Foot Tests   Anterior drawer: negative  Varus tilt: negative     Left foot additional comments: Soft tissue swelling to the anterior lateral aspect of the ankle just inferior to the lateral malleolus.  Firm, cystic lesion measuring approximately 1 cm in diameter.  No discomfort with palpation.  Range of motion muscle strength is appropriate.  No obvious deformity    5/7/25: Continued cyst formation involving the inferior aspect of the lateral malleolus.  Mild increase in size as compared to previous assessment.  Firm nodule with mild discomfort with palpation.  No skin changes or further concerns.      Results      Assessment & Plan   Diagnoses and all orders for this visit:    1. Chronic pain of left ankle (Primary)    2. Ganglion cyst of left foot      Assessment & Plan    The mass is likely originating from the " ankle joint based on its location. Aspiration was discussed as a potential treatment option, but it carries a high risk of recurrence. Surgical resection was also considered, which involves removing the sac entirely and cauterizing the defect in the soft tissue to prevent recurrence. The recovery period post-surgery was explained, including the need for a postoperative shoe and limited walking. The patient was informed that she could return to work if she feels comfortable, but should avoid any activity that might cause friction on the incision site. The use of sutures was recommended over staples due to the tension and stress in the area. The patient was advised to contact Bessy for further discussion regarding timing and scheduling of the procedure. If the mass increases in size or becomes intolerable, surgical intervention may be necessary. Aspiration will be performed today using an 18-gauge needle, followed by the application of a Coban dressing and gauze. A compression stocking like Tubigrip will be provided for daytime use.Reviewed proper basic stretching and manual therapy exercises along with appropriate shoes and activity.  Discussed proper use and/or avoidance of OTC anti-inflammatories.  Patient is to call with any additional issues or concerns.  Greater than 20 minutes was spent before, during, and after evaluation for patient care.    The encounter note is created with the use of AI technology.  I do apologize if there are typos and/or confusion within the note.  Please feel free to contact me or my office with any questions or concerns.    Ganglion cyst aspiration: Left ankle    Consent and timeout were performed prior to procedure.  The lateral aspect of the ankle was cleansed with alcohol.  Approximately 3 cc of 1% lidocaine plain were infiltrated proximal to the area in question.  Once anesthetized, an 18-gauge hypodermic needle with a 10 cc syringe was introduced into the ganglion cyst.   Approximately 4 cc of gelatinous fluid highly consistent with a ganglion cyst was aspirated without complication.  Hemostasis was achieved.  Patient tolerated the procedure well.    Follow-up  The patient will follow up in 3 months.               Patient or patient representative verbalized consent for the use of Ambient Listening during the visit with  KEEGAN Marcelo DPM for chart documentation. 5/8/2025  07:38 EDT    KEEGAN Marcelo DPM

## 2025-05-15 ENCOUNTER — LAB (OUTPATIENT)
Dept: LAB | Facility: HOSPITAL | Age: 65
End: 2025-05-15
Payer: COMMERCIAL

## 2025-05-15 ENCOUNTER — TRANSCRIBE ORDERS (OUTPATIENT)
Dept: ADMINISTRATIVE | Facility: HOSPITAL | Age: 65
End: 2025-05-15
Payer: COMMERCIAL

## 2025-05-15 DIAGNOSIS — R19.8 RASPBERRY TONGUE: ICD-10-CM

## 2025-05-15 DIAGNOSIS — N95.2 POSTMENOPAUSAL ATROPHIC VAGINITIS: ICD-10-CM

## 2025-05-15 DIAGNOSIS — G93.32 CHRONIC FATIGUE SYNDROME: ICD-10-CM

## 2025-05-15 DIAGNOSIS — L65.9 BALDNESS: ICD-10-CM

## 2025-05-15 DIAGNOSIS — E53.8 BIOTIN-(PROPIONYL-COA-CARBOXYLASE) LIGASE DEFICIENCY: ICD-10-CM

## 2025-05-15 DIAGNOSIS — E55.9 AVITAMINOSIS D: ICD-10-CM

## 2025-05-15 DIAGNOSIS — R68.82 DECREASED LIBIDO: ICD-10-CM

## 2025-05-15 DIAGNOSIS — Z13.1 SCREENING FOR DIABETES MELLITUS: ICD-10-CM

## 2025-05-15 DIAGNOSIS — Z79.890 NEED FOR PROPHYLACTIC HORMONE REPLACEMENT THERAPY (POSTMENOPAUSAL): ICD-10-CM

## 2025-05-15 DIAGNOSIS — R53.83 TIREDNESS: ICD-10-CM

## 2025-05-15 DIAGNOSIS — E01.8 IODINE HYPOTHYROIDISM: ICD-10-CM

## 2025-05-15 DIAGNOSIS — E63.9 NUTRITIONAL DISORDER: ICD-10-CM

## 2025-05-15 DIAGNOSIS — R37 SEXUAL DYSFUNCTION: ICD-10-CM

## 2025-05-15 DIAGNOSIS — F34.81 SEVERE MOOD DYSREGULATION DISORDER: ICD-10-CM

## 2025-05-15 DIAGNOSIS — E34.50 DISORDER OF ANDROGEN RECEPTOR: ICD-10-CM

## 2025-05-15 DIAGNOSIS — M25.50 PAIN IN JOINT, MULTIPLE SITES: ICD-10-CM

## 2025-05-15 DIAGNOSIS — E56.9 MULTIPLE VITAMIN DEFICIENCY DISEASE: ICD-10-CM

## 2025-05-15 DIAGNOSIS — N39.3 FEMALE STRESS INCONTINENCE: ICD-10-CM

## 2025-05-15 DIAGNOSIS — E34.50 DISORDER OF ANDROGEN RECEPTOR: Primary | ICD-10-CM

## 2025-05-15 DIAGNOSIS — E34.9 ENDOCRINE DISORDER RELATED TO PUBERTY: ICD-10-CM

## 2025-05-15 LAB
25(OH)D3 SERPL-MCNC: 85.8 NG/ML (ref 30–100)
ESTRADIOL SERPL HS-MCNC: 22.3 PG/ML
FERRITIN SERPL-MCNC: 155 NG/ML (ref 13–150)
FOLATE SERPL-MCNC: >20 NG/ML (ref 4.78–24.2)
HBA1C MFR BLD: 5.32 % (ref 4.8–5.6)
IRON 24H UR-MRATE: 138 MCG/DL (ref 37–145)
IRON SATN MFR SERPL: 44 % (ref 20–50)
PROGEST SERPL-MCNC: 2.05 NG/ML
T3FREE SERPL-MCNC: 2.99 PG/ML (ref 2–4.4)
TIBC SERPL-MCNC: 311 MCG/DL (ref 298–536)
TRANSFERRIN SERPL-MCNC: 209 MG/DL (ref 200–360)
TSH SERPL DL<=0.05 MIU/L-ACNC: 1.3 UIU/ML (ref 0.27–4.2)
VIT B12 BLD-MCNC: 598 PG/ML (ref 211–946)

## 2025-05-15 PROCEDURE — 84443 ASSAY THYROID STIM HORMONE: CPT

## 2025-05-15 PROCEDURE — 82627 DEHYDROEPIANDROSTERONE: CPT

## 2025-05-15 PROCEDURE — 84402 ASSAY OF FREE TESTOSTERONE: CPT

## 2025-05-15 PROCEDURE — 83540 ASSAY OF IRON: CPT

## 2025-05-15 PROCEDURE — 84466 ASSAY OF TRANSFERRIN: CPT

## 2025-05-15 PROCEDURE — 84144 ASSAY OF PROGESTERONE: CPT

## 2025-05-15 PROCEDURE — 84482 T3 REVERSE: CPT

## 2025-05-15 PROCEDURE — 83789 MASS SPECTROMETRY QUAL/QUAN: CPT

## 2025-05-15 PROCEDURE — 82728 ASSAY OF FERRITIN: CPT

## 2025-05-15 PROCEDURE — 84403 ASSAY OF TOTAL TESTOSTERONE: CPT

## 2025-05-15 PROCEDURE — 84481 FREE ASSAY (FT-3): CPT

## 2025-05-15 PROCEDURE — 84140 ASSAY OF PREGNENOLONE: CPT

## 2025-05-15 PROCEDURE — 82607 VITAMIN B-12: CPT

## 2025-05-15 PROCEDURE — 82306 VITAMIN D 25 HYDROXY: CPT

## 2025-05-15 PROCEDURE — 82746 ASSAY OF FOLIC ACID SERUM: CPT

## 2025-05-15 PROCEDURE — 83036 HEMOGLOBIN GLYCOSYLATED A1C: CPT

## 2025-05-15 PROCEDURE — 36415 COLL VENOUS BLD VENIPUNCTURE: CPT

## 2025-05-15 PROCEDURE — 82670 ASSAY OF TOTAL ESTRADIOL: CPT

## 2025-05-17 LAB — DHEA-S SERPL-MCNC: 94.3 UG/DL (ref 29.4–220.5)

## 2025-05-19 LAB
IODINE SERPL-MCNC: 39.5 UG/L (ref 31.1–64.6)
TESTOST FREE SERPL-MCNC: 0.8 PG/ML (ref 0–4.2)
TESTOST SERPL-MCNC: 38.4 NG/DL (ref 7–40)

## 2025-05-20 LAB — PREG SERPL-MCNC: 122 NG/DL

## 2025-05-23 LAB — T3REVERSE SERPL-MCNC: 19 NG/DL (ref 9.2–24.1)

## 2025-07-28 ENCOUNTER — OFFICE VISIT (OUTPATIENT)
Age: 65
End: 2025-07-28
Payer: COMMERCIAL

## 2025-07-28 VITALS
HEIGHT: 62 IN | HEART RATE: 65 BPM | OXYGEN SATURATION: 99 % | RESPIRATION RATE: 20 BRPM | WEIGHT: 141 LBS | BODY MASS INDEX: 25.95 KG/M2

## 2025-07-28 DIAGNOSIS — G89.29 CHRONIC PAIN OF LEFT ANKLE: Primary | ICD-10-CM

## 2025-07-28 DIAGNOSIS — M25.572 CHRONIC PAIN OF LEFT ANKLE: Primary | ICD-10-CM

## 2025-07-28 DIAGNOSIS — M67.472 GANGLION CYST OF LEFT FOOT: ICD-10-CM

## 2025-07-28 PROCEDURE — 99213 OFFICE O/P EST LOW 20 MIN: CPT | Performed by: PODIATRIST

## 2025-07-28 PROCEDURE — 20612 ASPIRATE/INJ GANGLION CYST: CPT | Performed by: PODIATRIST

## 2025-07-29 NOTE — PROGRESS NOTES
"07/28/2025  Foot and Ankle Surgery - Established Patient/Follow-up  Provider: Dr. Amando Marcelo DPM  Location: [facility]    Subjective:  Natividad Messer is a 65 y.o. female.     Chief Complaint   Patient presents with    Left Ankle - Follow-up, Cyst     Wants drained    Follow-up     PCP: Sara Powell APRN  Last PCP Visit: 9/10/24         History of Present Illness  The patient presents for a cyst.    She reports that the cyst has reappeared and seems to have increased in size. She is considering surgical removal of the cyst but prefers to postpone it until later in the year. She is curious about the type of sutures that will be used post-surgery.      Allergies   Allergen Reactions    Morphine Other (See Comments)     Teeth chattering and body tremors       Current Outpatient Medications on File Prior to Visit   Medication Sig Dispense Refill    B Complex Vitamins (B COMPLEX 100 PO) Take 1 capsule by mouth Daily.      meclizine (ANTIVERT) 25 MG tablet Take 1 tablet by mouth 3 (Three) Times a Day As Needed for Dizziness. 30 tablet 0    ondansetron ODT (ZOFRAN-ODT) 4 MG disintegrating tablet Place 1 tablet on the tongue Every 8 (Eight) Hours As Needed for Vomiting. 12 tablet 0    polyethylene glycol (MiraLax) 17 GM/SCOOP powder Take as directed per instructions for bowel prep 238 g 0    Prenatal Vit-Fe Fumarate-FA (PRENATAL VITAMIN PO) Take 1 tablet by mouth Daily.      VITAMIN D-VITAMIN K PO Take 5,000 Units by mouth Daily.      estradiol-levonorgestrel (Climara Pro) 0.045-0.015 MG/DAY Apply 1 (one) Patch Weekly on skin once a week. Apply to clean, dry area on lower abdomen (avoid breasts, waistline). Rotate application sites. 4 patch 12     No current facility-administered medications on file prior to visit.       Objective   Pulse 65   Resp 20   Ht 157.5 cm (62\")   Wt 64 kg (141 lb)   SpO2 99%   BMI 25.79 kg/m²     Foot/Ankle Exam  Physical Exam  GENERAL  Appearance:  appears stated " age  Orientation:  AAOx3  Affect:  appropriate     VASCULAR      Left Foot Vascularity   Normal vascular exam    Dorsalis pedis:  2+  Posterior tibial:  2+  Skin temperature:  warm  Edema grading:  None  CFT:  < 3 seconds  Pedal hair growth:  Present  Varicosities:  none     NEUROLOGIC      Left Foot Neurologic   Light touch sensation: normal  Hot/Cold sensation:  normal  Achilles reflex:  2+     MUSCULOSKELETAL      Left Foot Musculoskeletal   Ecchymosis:  none  Tenderness:  none  Arch:  Normal     MUSCLE STRENGTH      Left Foot Muscle Strength   Normal strength    Foot dorsiflexion:  5  Foot plantar flexion:  5  Foot inversion:  5  Foot eversion:  5     RANGE OF MOTION      Left Foot Range of Motion   Foot and ankle ROM within normal limits       DERMATOLOGIC          Left Foot Dermatologic   Skin  Left foot skin is intact.      TESTS      Left Foot Tests   Anterior drawer: negative  Varus tilt: negative     Left foot additional comments: Soft tissue swelling to the anterior lateral aspect of the ankle just inferior to the lateral malleolus.  Firm, cystic lesion measuring approximately 1 cm in diameter.  No discomfort with palpation.  Range of motion muscle strength is appropriate.  No obvious deformity     5/7/25: Continued cyst formation involving the inferior aspect of the lateral malleolus.  Mild increase in size as compared to previous assessment.  Firm nodule with mild discomfort with palpation.  No skin changes or further concerns.    7/28/25: Recurrent firm nodule involving the lateral inferior aspect of the ankle.  Mild discomfort with palpation.  No skin changes.  Range of motion muscle strength are appropriate.  Mild instability noted.      Results      Assessment & Plan   Diagnoses and all orders for this visit:    1. Chronic pain of left ankle (Primary)    2. Ganglion cyst of left foot      Assessment & Plan    The cyst has recurred and appears to have increased in size, causing discomfort. Aspiration  was performed today, but it was explained that the cyst might recur or enlarge. The patient was informed that surgical resection is the most appropriate option if the cyst continues to cause discomfort or increase in size. She was advised that the procedure would involve wearing a postoperative shoe and could potentially return to work by Monday if done on a Friday. The use of nonabsorbable sutures was discussed. An MRI may be considered prior to surgery to better understand the cyst's origin. She will discuss with her  and call back to schedule the surgery, possibly around Thanksgiving.Reviewed proper basic stretching and manual therapy exercises along with appropriate shoes and activity.  Discussed proper use and/or avoidance of OTC anti-inflammatories.  Patient is to call with any additional issues or concerns.  Greater than 20 minutes was spent before, during, and after evaluation for patient care excluding procedure.    The encounter note is created with the use of AI technology.  I do apologize if there are typos and/or confusion within the note.  Please feel free to contact me or my office with any questions or concerns.    Ganglion cyst aspiration: Left ankle     Consent and timeout were performed prior to procedure.  The lateral aspect of the ankle was cleansed with alcohol.  Approximately 3 cc of 1% lidocaine plain were infiltrated proximal to the area in question.  Once anesthetized, an 18-gauge hypodermic needle with a 10 cc syringe was introduced into the ganglion cyst.  Approximately 4 cc of gelatinous fluid highly consistent with a ganglion cyst was aspirated without complication.  Hemostasis was achieved.  Patient tolerated the procedure well.    Follow-up in 2 to 3 months prior to anticipated surgery           Patient or patient representative verbalized consent for the use of Ambient Listening during the visit with  KEEGAN Marcelo DPM for chart documentation. 7/29/2025  07:00 EDKEEGAN ALLISON  Amando Marcelo, DAWSONM

## (undated) DEVICE — PK ENDO GI 50

## (undated) DEVICE — TRAP WIDEEYE POLYP

## (undated) DEVICE — SNAR POLYP CAPTIVATOR2 RND STFF 2.4 25MM 240CM